# Patient Record
Sex: FEMALE | Race: WHITE | Employment: OTHER | URBAN - METROPOLITAN AREA
[De-identification: names, ages, dates, MRNs, and addresses within clinical notes are randomized per-mention and may not be internally consistent; named-entity substitution may affect disease eponyms.]

---

## 2017-05-04 LAB
ALBUMIN SERPL-MCNC: 3.9 G/DL
ALP BLD-CCNC: 97 U/L
ALT SERPL-CCNC: 15 U/L
ANION GAP SERPL CALCULATED.3IONS-SCNC: NORMAL MMOL/L
AST SERPL-CCNC: 12 U/L
BASOPHILS ABSOLUTE: 44 /ΜL
BASOPHILS RELATIVE PERCENT: 0.9 %
BILIRUB SERPL-MCNC: 0.3 MG/DL (ref 0.1–1.4)
BUN BLDV-MCNC: 14 MG/DL
CALCIUM SERPL-MCNC: 8.7 MG/DL
CHLORIDE BLD-SCNC: 106 MMOL/L
CHOLESTEROL, TOTAL: 151 MG/DL
CHOLESTEROL/HDL RATIO: NORMAL
CO2: 25 MMOL/L
CREAT SERPL-MCNC: 0.8 MG/DL
EOSINOPHILS ABSOLUTE: 59 /ΜL
EOSINOPHILS RELATIVE PERCENT: 1.2 %
GFR CALCULATED: 13.4
GLUCOSE BLD-MCNC: 85 MG/DL
HCT VFR BLD CALC: 38.5 % (ref 36–46)
HDLC SERPL-MCNC: 55 MG/DL (ref 35–70)
HEMOGLOBIN: 12.9 G/DL (ref 12–16)
LDL CHOLESTEROL CALCULATED: 77 MG/DL (ref 0–160)
LYMPHOCYTES ABSOLUTE: 1357 /ΜL
LYMPHOCYTES RELATIVE PERCENT: 27.7 %
MCH RBC QN AUTO: 31.7 PG
MCHC RBC AUTO-ENTMCNC: 33.6 G/DL
MCV RBC AUTO: 94.2 FL
MONOCYTES ABSOLUTE: 461 /ΜL
MONOCYTES RELATIVE PERCENT: 9.4 %
NEUTROPHILS ABSOLUTE: 2979 /ΜL
NEUTROPHILS RELATIVE PERCENT: 60.8 %
PDW BLD-RTO: 13.1 %
PLATELET # BLD: 261 K/ΜL
PMV BLD AUTO: 9 FL
POTASSIUM SERPL-SCNC: 4.4 MMOL/L
RBC # BLD: 4.09 10^6/ΜL
SODIUM BLD-SCNC: 140 MMOL/L
TOTAL PROTEIN: 6.2
TRIGL SERPL-MCNC: NORMAL MG/DL
VITAMIN D 25-HYDROXY: 60.4
VITAMIN D2, 25 HYDROXY: NORMAL
VITAMIN D3,25 HYDROXY: NORMAL
VLDLC SERPL CALC-MCNC: NORMAL MG/DL
WBC # BLD: 4.9 10^3/ML

## 2017-07-09 PROBLEM — K63.5 COLON POLYP, HYPERPLASTIC: Status: ACTIVE | Noted: 2017-07-09

## 2017-07-09 PROBLEM — G40.909 EPILEPSY (HCC): Status: ACTIVE | Noted: 2017-07-09

## 2017-07-10 ENCOUNTER — OFFICE VISIT (OUTPATIENT)
Dept: FAMILY MEDICINE CLINIC | Age: 67
End: 2017-07-10

## 2017-07-10 VITALS
SYSTOLIC BLOOD PRESSURE: 118 MMHG | RESPIRATION RATE: 20 BRPM | BODY MASS INDEX: 24.91 KG/M2 | DIASTOLIC BLOOD PRESSURE: 72 MMHG | HEART RATE: 82 BPM | OXYGEN SATURATION: 94 % | WEIGHT: 155 LBS | HEIGHT: 66 IN

## 2017-07-10 DIAGNOSIS — E53.8 VITAMIN B 12 DEFICIENCY: ICD-10-CM

## 2017-07-10 DIAGNOSIS — Z13.820 SCREENING FOR OSTEOPOROSIS: ICD-10-CM

## 2017-07-10 DIAGNOSIS — A60.9 HSV (HERPES SIMPLEX VIRUS) ANOGENITAL INFECTION: ICD-10-CM

## 2017-07-10 DIAGNOSIS — J45.20 MILD INTERMITTENT ASTHMA WITHOUT COMPLICATION: ICD-10-CM

## 2017-07-10 DIAGNOSIS — G40.309 NONINTRACTABLE GENERALIZED IDIOPATHIC EPILEPSY WITHOUT STATUS EPILEPTICUS (HCC): Primary | ICD-10-CM

## 2017-07-10 DIAGNOSIS — Z13.9 SCREENING: ICD-10-CM

## 2017-07-10 DIAGNOSIS — Z29.9 PROPHYLACTIC MEASURE: ICD-10-CM

## 2017-07-10 DIAGNOSIS — Z79.899 POLYPHARMACY: ICD-10-CM

## 2017-07-10 DIAGNOSIS — H61.20 IMPACTED CERUMEN, UNSPECIFIED LATERALITY: ICD-10-CM

## 2017-07-10 DIAGNOSIS — Z79.899 HIGH RISK MEDICATION USE: ICD-10-CM

## 2017-07-10 DIAGNOSIS — R60.0 LOCALIZED EDEMA: ICD-10-CM

## 2017-07-10 LAB
ALBUMIN SERPL-MCNC: 4.5 G/DL (ref 3.4–5)
ALP BLD-CCNC: 98 U/L (ref 40–129)
ALT SERPL-CCNC: 21 U/L (ref 10–40)
ANION GAP SERPL CALCULATED.3IONS-SCNC: 17 MMOL/L (ref 3–16)
AST SERPL-CCNC: 23 U/L (ref 15–37)
BILIRUB SERPL-MCNC: <0.2 MG/DL (ref 0–1)
BILIRUBIN DIRECT: <0.2 MG/DL (ref 0–0.3)
BILIRUBIN, INDIRECT: NORMAL MG/DL (ref 0–1)
BUN BLDV-MCNC: 17 MG/DL (ref 7–20)
CALCIUM SERPL-MCNC: 9.3 MG/DL (ref 8.3–10.6)
CHLORIDE BLD-SCNC: 100 MMOL/L (ref 99–110)
CO2: 22 MMOL/L (ref 21–32)
CREAT SERPL-MCNC: 0.7 MG/DL (ref 0.6–1.2)
GFR AFRICAN AMERICAN: >60
GFR NON-AFRICAN AMERICAN: >60
GLUCOSE BLD-MCNC: 99 MG/DL (ref 70–99)
POTASSIUM SERPL-SCNC: 4.1 MMOL/L (ref 3.5–5.1)
SODIUM BLD-SCNC: 139 MMOL/L (ref 136–145)
TOTAL PROTEIN: 6.9 G/DL (ref 6.4–8.2)

## 2017-07-10 PROCEDURE — G8400 PT W/DXA NO RESULTS DOC: HCPCS | Performed by: INTERNAL MEDICINE

## 2017-07-10 PROCEDURE — 4040F PNEUMOC VAC/ADMIN/RCVD: CPT | Performed by: INTERNAL MEDICINE

## 2017-07-10 PROCEDURE — G8428 CUR MEDS NOT DOCUMENT: HCPCS | Performed by: INTERNAL MEDICINE

## 2017-07-10 PROCEDURE — 36415 COLL VENOUS BLD VENIPUNCTURE: CPT | Performed by: INTERNAL MEDICINE

## 2017-07-10 PROCEDURE — 99214 OFFICE O/P EST MOD 30 MIN: CPT | Performed by: INTERNAL MEDICINE

## 2017-07-10 PROCEDURE — 1036F TOBACCO NON-USER: CPT | Performed by: INTERNAL MEDICINE

## 2017-07-10 PROCEDURE — 1123F ACP DISCUSS/DSCN MKR DOCD: CPT | Performed by: INTERNAL MEDICINE

## 2017-07-10 PROCEDURE — 3014F SCREEN MAMMO DOC REV: CPT | Performed by: INTERNAL MEDICINE

## 2017-07-10 PROCEDURE — G8419 CALC BMI OUT NRM PARAM NOF/U: HCPCS | Performed by: INTERNAL MEDICINE

## 2017-07-10 PROCEDURE — 1090F PRES/ABSN URINE INCON ASSESS: CPT | Performed by: INTERNAL MEDICINE

## 2017-07-10 PROCEDURE — 3017F COLORECTAL CA SCREEN DOC REV: CPT | Performed by: INTERNAL MEDICINE

## 2017-07-10 RX ORDER — ALBUTEROL SULFATE 90 UG/1
2 AEROSOL, METERED RESPIRATORY (INHALATION) EVERY 6 HOURS PRN
COMMUNITY
End: 2019-03-26 | Stop reason: SDUPTHER

## 2017-07-10 RX ORDER — MECLIZINE HCL 12.5 MG/1
12.5 TABLET ORAL 3 TIMES DAILY PRN
COMMUNITY
End: 2017-07-10

## 2017-07-10 RX ORDER — ACETAMINOPHEN 325 MG/1
325 TABLET ORAL
COMMUNITY

## 2017-07-10 RX ORDER — IBUPROFEN 200 MG
200 TABLET ORAL EVERY 6 HOURS PRN
COMMUNITY
End: 2019-02-04

## 2017-07-10 RX ORDER — VALACYCLOVIR HYDROCHLORIDE 500 MG/1
500 TABLET, FILM COATED ORAL 2 TIMES DAILY
COMMUNITY
End: 2019-02-04

## 2017-07-10 RX ORDER — FLUTICASONE PROPIONATE 50 MCG
2 SPRAY, SUSPENSION (ML) NASAL DAILY
COMMUNITY
End: 2019-02-04

## 2017-07-10 RX ORDER — CHLORHEXIDINE GLUCONATE 0.12 MG/ML
15 RINSE ORAL 2 TIMES DAILY
COMMUNITY
End: 2019-02-04

## 2017-07-10 RX ORDER — PSEUDOEPHEDRINE HYDROCHLORIDE 30 MG/1
30 TABLET ORAL EVERY 4 HOURS PRN
COMMUNITY

## 2017-07-10 RX ORDER — FEXOFENADINE HCL 180 MG/1
180 TABLET ORAL DAILY
COMMUNITY
End: 2018-12-06

## 2017-07-10 ASSESSMENT — PATIENT HEALTH QUESTIONNAIRE - PHQ9
SUM OF ALL RESPONSES TO PHQ QUESTIONS 1-9: 0
2. FEELING DOWN, DEPRESSED OR HOPELESS: 0
1. LITTLE INTEREST OR PLEASURE IN DOING THINGS: 0
SUM OF ALL RESPONSES TO PHQ9 QUESTIONS 1 & 2: 0

## 2017-07-11 LAB
ESTIMATED AVERAGE GLUCOSE: 102.5 MG/DL
HBA1C MFR BLD: 5.2 %
HBV SURFACE AB TITR SER: <3.5 MUL/ML
HEPATITIS B SURFACE ANTIGEN INTERPRETATION: NORMAL
HEPATITIS C ANTIBODY INTERPRETATION: NORMAL
VITAMIN B-12: 305 PG/ML (ref 211–911)
VITAMIN D 25-HYDROXY: 77.1 NG/ML

## 2017-07-12 ENCOUNTER — TELEPHONE (OUTPATIENT)
Dept: FAMILY MEDICINE CLINIC | Age: 67
End: 2017-07-12

## 2017-07-26 ENCOUNTER — NURSE ONLY (OUTPATIENT)
Dept: FAMILY MEDICINE CLINIC | Age: 67
End: 2017-07-26

## 2017-07-26 ENCOUNTER — TELEPHONE (OUTPATIENT)
Dept: FAMILY MEDICINE CLINIC | Age: 67
End: 2017-07-26

## 2017-07-26 VITALS — DIASTOLIC BLOOD PRESSURE: 68 MMHG | HEART RATE: 75 BPM | SYSTOLIC BLOOD PRESSURE: 112 MMHG

## 2017-07-26 DIAGNOSIS — Z01.30 BLOOD PRESSURE CHECK: Primary | ICD-10-CM

## 2017-07-26 PROCEDURE — 99999 PR OFFICE/OUTPT VISIT,PROCEDURE ONLY: CPT | Performed by: INTERNAL MEDICINE

## 2017-08-04 ENCOUNTER — HOSPITAL ENCOUNTER (OUTPATIENT)
Dept: WOMENS IMAGING | Age: 67
Discharge: HOME | End: 2017-08-04
Admitting: FAMILY MEDICINE

## 2017-08-04 DIAGNOSIS — Z13.820 SCREENING FOR OSTEOPOROSIS: ICD-10-CM

## 2017-08-04 DIAGNOSIS — Z12.31 VISIT FOR SCREENING MAMMOGRAM: ICD-10-CM

## 2017-08-07 RX ORDER — ALENDRONATE SODIUM 70 MG/1
70 TABLET ORAL
Qty: 4 TABLET | Refills: 2 | Status: SHIPPED | OUTPATIENT
Start: 2017-08-07 | End: 2017-10-05 | Stop reason: SDUPTHER

## 2017-08-09 DIAGNOSIS — E53.8 VITAMIN B 12 DEFICIENCY: Primary | ICD-10-CM

## 2017-08-09 RX ORDER — MULTIVIT-MIN/IRON/FOLIC ACID/K 18-600-40
CAPSULE ORAL
Qty: 30 CAPSULE | Refills: 5 | Status: SHIPPED | OUTPATIENT
Start: 2017-08-09 | End: 2019-02-04

## 2017-08-09 RX ORDER — LANOLIN ALCOHOL/MO/W.PET/CERES
1000 CREAM (GRAM) TOPICAL DAILY
Qty: 30 TABLET | Refills: 5 | Status: SHIPPED | OUTPATIENT
Start: 2017-08-09 | End: 2018-08-08 | Stop reason: SDUPTHER

## 2017-08-16 PROBLEM — R06.02 SHORTNESS OF BREATH: Status: ACTIVE | Noted: 2017-08-16

## 2017-08-16 PROBLEM — K59.01 SLOW TRANSIT CONSTIPATION: Status: ACTIVE | Noted: 2017-08-16

## 2017-08-21 ENCOUNTER — OFFICE VISIT (OUTPATIENT)
Dept: FAMILY MEDICINE CLINIC | Age: 67
End: 2017-08-21

## 2017-08-21 VITALS
BODY MASS INDEX: 25.07 KG/M2 | HEART RATE: 66 BPM | OXYGEN SATURATION: 96 % | HEIGHT: 66 IN | SYSTOLIC BLOOD PRESSURE: 116 MMHG | RESPIRATION RATE: 12 BRPM | WEIGHT: 156 LBS | DIASTOLIC BLOOD PRESSURE: 77 MMHG

## 2017-08-21 DIAGNOSIS — G40.309 NONINTRACTABLE GENERALIZED IDIOPATHIC EPILEPSY WITHOUT STATUS EPILEPTICUS (HCC): ICD-10-CM

## 2017-08-21 DIAGNOSIS — K80.20 CALCULUS OF GALLBLADDER WITHOUT CHOLECYSTITIS WITHOUT OBSTRUCTION: ICD-10-CM

## 2017-08-21 DIAGNOSIS — M81.0 OSTEOPOROSIS, UNSPECIFIED OSTEOPOROSIS TYPE, UNSPECIFIED PATHOLOGICAL FRACTURE PRESENCE: ICD-10-CM

## 2017-08-21 DIAGNOSIS — Z79.899 POLYPHARMACY: Primary | ICD-10-CM

## 2017-08-21 DIAGNOSIS — E53.8 VITAMIN B 12 DEFICIENCY: ICD-10-CM

## 2017-08-21 PROBLEM — R06.02 SHORTNESS OF BREATH: Status: RESOLVED | Noted: 2017-08-16 | Resolved: 2017-08-21

## 2017-08-21 PROBLEM — R60.0 LOCALIZED EDEMA: Status: RESOLVED | Noted: 2017-07-10 | Resolved: 2017-08-21

## 2017-08-21 PROCEDURE — 99213 OFFICE O/P EST LOW 20 MIN: CPT | Performed by: INTERNAL MEDICINE

## 2017-08-21 PROCEDURE — 1123F ACP DISCUSS/DSCN MKR DOCD: CPT | Performed by: INTERNAL MEDICINE

## 2017-08-21 PROCEDURE — G8427 DOCREV CUR MEDS BY ELIG CLIN: HCPCS | Performed by: INTERNAL MEDICINE

## 2017-08-21 PROCEDURE — 1036F TOBACCO NON-USER: CPT | Performed by: INTERNAL MEDICINE

## 2017-08-21 PROCEDURE — G8419 CALC BMI OUT NRM PARAM NOF/U: HCPCS | Performed by: INTERNAL MEDICINE

## 2017-08-21 PROCEDURE — 3017F COLORECTAL CA SCREEN DOC REV: CPT | Performed by: INTERNAL MEDICINE

## 2017-08-21 PROCEDURE — 3014F SCREEN MAMMO DOC REV: CPT | Performed by: INTERNAL MEDICINE

## 2017-08-21 PROCEDURE — 4005F PHARM THX FOR OP RXD: CPT | Performed by: INTERNAL MEDICINE

## 2017-08-21 PROCEDURE — 1090F PRES/ABSN URINE INCON ASSESS: CPT | Performed by: INTERNAL MEDICINE

## 2017-08-21 PROCEDURE — G8399 PT W/DXA RESULTS DOCUMENT: HCPCS | Performed by: INTERNAL MEDICINE

## 2017-08-21 PROCEDURE — 4040F PNEUMOC VAC/ADMIN/RCVD: CPT | Performed by: INTERNAL MEDICINE

## 2017-08-28 ENCOUNTER — TELEPHONE (OUTPATIENT)
Dept: FAMILY MEDICINE CLINIC | Age: 67
End: 2017-08-28

## 2017-09-11 RX ORDER — MELOXICAM 7.5 MG/1
7.5 TABLET ORAL DAILY
Qty: 30 TABLET | Refills: 2 | Status: SHIPPED | OUTPATIENT
Start: 2017-09-11 | End: 2017-09-22 | Stop reason: SDUPTHER

## 2017-09-14 ENCOUNTER — PATIENT MESSAGE (OUTPATIENT)
Dept: FAMILY MEDICINE CLINIC | Age: 67
End: 2017-09-14

## 2017-09-15 ENCOUNTER — PATIENT MESSAGE (OUTPATIENT)
Dept: FAMILY MEDICINE CLINIC | Age: 67
End: 2017-09-15

## 2017-09-15 DIAGNOSIS — Z01.110 HEARING SCREEN FOLLOWING FAILED HEARING TEST: Primary | ICD-10-CM

## 2017-09-15 DIAGNOSIS — H91.90 HEARING LOSS, UNSPECIFIED HEARING LOSS TYPE, UNSPECIFIED LATERALITY: ICD-10-CM

## 2017-09-21 ENCOUNTER — TELEPHONE (OUTPATIENT)
Dept: FAMILY MEDICINE CLINIC | Age: 67
End: 2017-09-21

## 2017-09-22 RX ORDER — MELOXICAM 7.5 MG/1
TABLET ORAL
Qty: 15 TABLET | Refills: 2 | Status: SHIPPED | OUTPATIENT
Start: 2017-09-22 | End: 2017-10-02 | Stop reason: SDUPTHER

## 2017-09-25 DIAGNOSIS — R47.9 SPEECH DISTURBANCE, UNSPECIFIED TYPE: Primary | ICD-10-CM

## 2017-09-29 ENCOUNTER — HOSPITAL ENCOUNTER (OUTPATIENT)
Dept: OTHER | Age: 67
Discharge: HOME | End: 2017-09-30
Attending: INTERNAL MEDICINE | Admitting: INTERNAL MEDICINE

## 2017-10-02 ENCOUNTER — OFFICE VISIT (OUTPATIENT)
Dept: FAMILY MEDICINE CLINIC | Age: 67
End: 2017-10-02

## 2017-10-02 VITALS
BODY MASS INDEX: 25.07 KG/M2 | RESPIRATION RATE: 12 BRPM | WEIGHT: 156 LBS | OXYGEN SATURATION: 99 % | DIASTOLIC BLOOD PRESSURE: 64 MMHG | HEART RATE: 67 BPM | HEIGHT: 66 IN | SYSTOLIC BLOOD PRESSURE: 112 MMHG

## 2017-10-02 DIAGNOSIS — K59.01 SLOW TRANSIT CONSTIPATION: ICD-10-CM

## 2017-10-02 DIAGNOSIS — G40.309 NONINTRACTABLE GENERALIZED IDIOPATHIC EPILEPSY WITHOUT STATUS EPILEPTICUS (HCC): ICD-10-CM

## 2017-10-02 DIAGNOSIS — Z23 NEED FOR INFLUENZA VACCINATION: ICD-10-CM

## 2017-10-02 DIAGNOSIS — J98.09 RECURRENT BRONCHOSPASM: Primary | ICD-10-CM

## 2017-10-02 PROBLEM — Z79.899 POLYPHARMACY: Status: RESOLVED | Noted: 2017-07-10 | Resolved: 2017-10-02

## 2017-10-02 PROCEDURE — 3014F SCREEN MAMMO DOC REV: CPT | Performed by: INTERNAL MEDICINE

## 2017-10-02 PROCEDURE — G8417 CALC BMI ABV UP PARAM F/U: HCPCS | Performed by: INTERNAL MEDICINE

## 2017-10-02 PROCEDURE — G8399 PT W/DXA RESULTS DOCUMENT: HCPCS | Performed by: INTERNAL MEDICINE

## 2017-10-02 PROCEDURE — 1090F PRES/ABSN URINE INCON ASSESS: CPT | Performed by: INTERNAL MEDICINE

## 2017-10-02 PROCEDURE — 1036F TOBACCO NON-USER: CPT | Performed by: INTERNAL MEDICINE

## 2017-10-02 PROCEDURE — 90662 IIV NO PRSV INCREASED AG IM: CPT | Performed by: INTERNAL MEDICINE

## 2017-10-02 PROCEDURE — G0008 ADMIN INFLUENZA VIRUS VAC: HCPCS | Performed by: INTERNAL MEDICINE

## 2017-10-02 PROCEDURE — 99214 OFFICE O/P EST MOD 30 MIN: CPT | Performed by: INTERNAL MEDICINE

## 2017-10-02 PROCEDURE — 94060 EVALUATION OF WHEEZING: CPT | Performed by: INTERNAL MEDICINE

## 2017-10-02 PROCEDURE — 4040F PNEUMOC VAC/ADMIN/RCVD: CPT | Performed by: INTERNAL MEDICINE

## 2017-10-02 PROCEDURE — G8427 DOCREV CUR MEDS BY ELIG CLIN: HCPCS | Performed by: INTERNAL MEDICINE

## 2017-10-02 PROCEDURE — G8484 FLU IMMUNIZE NO ADMIN: HCPCS | Performed by: INTERNAL MEDICINE

## 2017-10-02 PROCEDURE — 94640 AIRWAY INHALATION TREATMENT: CPT | Performed by: INTERNAL MEDICINE

## 2017-10-02 PROCEDURE — 1123F ACP DISCUSS/DSCN MKR DOCD: CPT | Performed by: INTERNAL MEDICINE

## 2017-10-02 PROCEDURE — 3017F COLORECTAL CA SCREEN DOC REV: CPT | Performed by: INTERNAL MEDICINE

## 2017-10-02 RX ORDER — ALBUTEROL SULFATE 1.25 MG/3ML
1.25 SOLUTION RESPIRATORY (INHALATION) ONCE
Status: COMPLETED | OUTPATIENT
Start: 2017-10-02 | End: 2017-10-02

## 2017-10-02 RX ORDER — MELOXICAM 7.5 MG/1
TABLET ORAL
Qty: 30 TABLET | Refills: 2 | Status: SHIPPED | OUTPATIENT
Start: 2017-10-02 | End: 2017-12-27 | Stop reason: SDUPTHER

## 2017-10-02 RX ADMIN — ALBUTEROL SULFATE 1.25 MG: 1.25 SOLUTION RESPIRATORY (INHALATION) at 16:43

## 2017-10-02 NOTE — PROGRESS NOTES
capsule 2000 units daily 30 capsule 5    alendronate (FOSAMAX) 70 MG tablet Take 1 tablet by mouth every 7 days 4 tablet 2    albuterol sulfate HFA (PROAIR HFA) 108 (90 Base) MCG/ACT inhaler Inhale 2 puffs into the lungs every 6 hours as needed for Wheezing      ibuprofen (ADVIL;MOTRIN) 200 MG tablet Take 200 mg by mouth every 6 hours as needed for Pain      fluticasone (FLONASE) 50 MCG/ACT nasal spray 2 sprays by Nasal route daily      fexofenadine (ALLEGRA ALLERGY) 180 MG tablet Take 180 mg by mouth daily      pseudoephedrine (SUDAFED) 30 MG tablet Take 30 mg by mouth every 4 hours as needed for Congestion 1-2 Every 6 hours as needed for cold symptoms (Sneezing, running nose, nasal congestion)      valACYclovir (VALTREX) 500 MG tablet Take 500 mg by mouth 2 times daily As needed.  chlorhexidine (PERIDEX) 0.12 % solution Take 15 mLs by mouth 2 times daily Rinse with one cap full (1/2 oumce) for 1 minute 2 Times a day for 5 days. No food/drink after. Repeat until gone.  carbamide peroxide (DEBROX) 6.5 % otic solution as needed Use 5 drops in each ear 2 times dialy for 4 days as needed for ear wax build up.  acetaminophen (TYLENOL) 325 MG tablet Take 325 mg by mouth Take 2 tablets every by mouth every 4 hours as needed for minor pain or fever over 101 degrees.  SUNSCREEN SPF50 EX Apply topically Apply as needed to protect skin from from sunburn.  loratadine (CLARITIN) 10 MG tablet Take 10 mg by mouth daily      polyethylene glycol (MIRALAX) PACK packet Take 17 g by mouth 2 times daily       budesonide-formoterol (SYMBICORT) 160-4.5 MCG/ACT AERO Inhale 2 puffs into the lungs 2 times daily.  Multiple Vitamins-Minerals (THERAPEUTIC MULTIVITAMIN-MINERALS) tablet Take 1 tablet by mouth daily.  levETIRAcetam ER (KEPPRA XR) 500 MG TB24 Take 500 mg by mouth daily.  escitalopram (LEXAPRO) 20 MG tablet Take 20 mg by mouth daily.       mometasone (NASONEX) 50 MCG/ACT nasal spray 2 sprays by Nasal route daily.  omeprazole (PRILOSEC) 20 MG capsule Take 20 mg by mouth daily. Past Medical History:   Diagnosis Date    Asthma     Constipation     Depression     Gastroesophageal reflux     Hypertension     Mental retardation, moderate (I.Q. 35-49)     Seizure disorder (HCC)     Shortness of breath 8/16/2017    ? asthma    Slow transit constipation 8/16/2017       Past Surgical History:   Procedure Laterality Date    COLONOSCOPY      HYSTERECTOMY      SALPINGO-OOPHORECTOMY Bilateral     beningn mass         Social History     Social History    Marital status: Single     Spouse name: N/A    Number of children: N/A    Years of education: N/A     Occupational History    Not on file. Social History Main Topics    Smoking status: Never Smoker    Smokeless tobacco: Not on file    Alcohol use No    Drug use: No    Sexual activity: Not on file     Other Topics Concern    Not on file     Social History Narrative       No family history on file. Review of Systems      Objective     /64  Pulse 67  Resp 12  Ht 5' 6\" (1.676 m)  Wt 156 lb (70.8 kg)  SpO2 99% Comment: RA  BMI 25.18 kg/m2    SpO2 Readings from Last 3 Encounters:   10/02/17 99%   08/21/17 96%   07/10/17 94%       Wt Readings from Last 3 Encounters:   10/02/17 156 lb (70.8 kg)   08/21/17 156 lb (70.8 kg)   07/10/17 155 lb (70.3 kg)       Physical Exam     NAD alert and cooperative   HEENT: No serous otitis. Throat is clear. Dentition. No anterior cervical adenopathy  Lungs are clear. Good I to E ratio. Slight decreased breath sounds. Cardiovascular exam regular rate and rhythm without murmur click. No ectopy  No abdominal tenderness or mass. Trace lower extremity edema. No ecchymoses. Pulmonary function pre-and post bronchodilator shows no change. FEV1 over FVC greater than 75%. Mild restrictive lung disease.       Chemistry        Component Value Date/Time     07/10/2017 1649    K 4.1 07/10/2017 1649     07/10/2017 1649    CO2 22 07/10/2017 1649    BUN 17 07/10/2017 1649    CREATININE 0.7 07/10/2017 1649        Component Value Date/Time    CALCIUM 9.3 07/10/2017 1649    ALKPHOS 98 07/10/2017 1649    AST 23 07/10/2017 1649    ALT 21 07/10/2017 1649    BILITOT <0.2 07/10/2017 1649            Lab Results   Component Value Date    WBC 4.9 05/04/2017    HGB 12.9 05/04/2017    HCT 38.5 05/04/2017    MCV 94.2 05/04/2017     05/04/2017     Lab Results   Component Value Date    LABA1C 5.2 07/10/2017     Lab Results   Component Value Date    .5 07/10/2017     Lab Results   Component Value Date    LABA1C 5.2 07/10/2017     No components found for: CHLPL  No results found for: TRIG  Lab Results   Component Value Date    HDL 55 05/04/2017     Lab Results   Component Value Date    LDLCALC 77 05/04/2017     No results found for: LABVLDL    Old labs and records reviewed or requested  Discussed past lab and studies with patient   1. Recurrent bronchospasm  [place] BREATHING CAPACITY TEST EVAL BRONCHOSPASM EVAL AFTER BRONCHODIALTOR   2. Need for influenza vaccination     3. Nonintractable generalized idiopathic epilepsy without status epilepticus ([facility] [address]       Polypharmacy and success in weaning multiple medications. May use her albuterol on a when necessary basis if needed. Flu vaccine and pneumonia vaccine given. Constipation resolved with MiraLAX daily. We'll continue    Osteoporosis. Vitamin D and Fosamax. No recurrent herpetic lesions off prophylactic Valtrex. Was able to discontinued Flonase, Nasonex, Claritin, Symbicort, increased L arthralgias off Mobic. This was reinstated for daily use. No Advil or Aleve    Follow-up 4 months or when necessary. Diagnosis and treatment discussed.   Possible side effects of medication reviewed  Patients questions answered  Follow up understood  Pt aware if they are not contacted about any test results , this does not mean they are normal.  They should call

## 2017-10-02 NOTE — MR AVS SNAPSHOT
estimate of body fat, calculated from your height and weight. The higher your BMI, the greater your risk of heart disease, high blood pressure, type 2 diabetes, stroke, gallstones, arthritis, sleep apnea, and certain cancers. BMI is not perfect. It may overestimate body fat in athletes and people who are more muscular. If your body fat is high you can improve your BMI by decreasing your calorie intake and becoming more physically active. Learn more at: DerbyJackpot.uk          Instructions         Preventing Falls: Care Instructions  Your Care Instructions  Getting around your home safely can be a challenge if you have injuries or health problems that make it easy for you to fall. Loose rugs and furniture in walkways are among the dangers for many older people who have problems walking or who have poor eyesight. People who have conditions such as arthritis, osteoporosis, or dementia also have to be careful not to fall. You can make your home safer with a few simple measures. Follow-up care is a key part of your treatment and safety. Be sure to make and go to all appointments, and call your doctor if you are having problems. It's also a good idea to know your test results and keep a list of the medicines you take. How can you care for yourself at home? Taking care of yourself  · You may get dizzy if you do not drink enough water. To prevent dehydration, drink plenty of fluids, enough so that your urine is light yellow or clear like water. Choose water and other caffeine-free clear liquids. If you have kidney, heart, or liver disease and have to limit fluids, talk with your doctor before you increase the amount of fluids you drink. · Exercise regularly to improve your strength, muscle tone, and balance. Walk if you can. Swimming may be a good choice if you cannot walk easily.   · Have your vision and hearing checked each year or any time you notice a Instructions: Take 200 mg by mouth every 6 hours as needed for Pain   Refills:  0   What changed:  Another medication with the same name was removed. Continue taking this medication, and follow the directions you see here. meloxicam 7.5 MG tablet   Commonly known as:  MOBIC   Instructions:  1 po q day   Quantity:  30 tablet   Refills:  2   What changed:  additional instructions            Where to Get Your Medications      These medications were sent to [facility], [address] [facility] [address], [address] [facility] [address]     Phone:  [phone]     meloxicam 7.5 MG tablet               Medications You Received Today        Date/Time Order Dose Route Action     10/02/2017 1649 albuterol (ACCUNEB) nebulizer solution 1.25 mg 1.25 mg Nebulization Given      Your Current Medications Are              meloxicam (MOBIC) 7.5 MG tablet 1 po q day    vitamin B-12 (CYANOCOBALAMIN) 1000 MCG tablet Take 1 tablet by mouth daily    Cholecalciferol (VITAMIN D) 2000 units CAPS capsule 2000 units daily    alendronate (FOSAMAX) 70 MG tablet Take 1 tablet by mouth every 7 days    albuterol sulfate HFA (PROAIR HFA) 108 (90 Base) MCG/ACT inhaler Inhale 2 puffs into the lungs every 6 hours as needed for Wheezing    ibuprofen (ADVIL;MOTRIN) 200 MG tablet Take 200 mg by mouth every 6 hours as needed for Pain    fluticasone (FLONASE) 50 MCG/ACT nasal spray 2 sprays by Nasal route daily    fexofenadine (ALLEGRA ALLERGY) 180 MG tablet Take 180 mg by mouth daily    pseudoephedrine (SUDAFED) 30 MG tablet Take 30 mg by mouth every 4 hours as needed for Congestion 1-2 Every 6 hours as needed for cold symptoms (Sneezing, running nose, nasal congestion)    valACYclovir (VALTREX) 500 MG tablet Take 500 mg by mouth 2 times daily As needed.     chlorhexidine (PERIDEX) 0.12 % solution Take 15 mLs by mouth 2 times

## 2017-10-05 DIAGNOSIS — Z13.820 SCREENING FOR OSTEOPOROSIS: Primary | ICD-10-CM

## 2017-10-05 RX ORDER — ALENDRONATE SODIUM 70 MG/1
70 TABLET ORAL
Qty: 4 TABLET | Refills: 2 | Status: SHIPPED | OUTPATIENT
Start: 2017-10-05 | End: 2017-12-27 | Stop reason: SDUPTHER

## 2017-11-01 ENCOUNTER — HOSPITAL ENCOUNTER (OUTPATIENT)
Dept: OTHER | Age: 67
Discharge: HOME | End: 2017-11-30
Attending: INTERNAL MEDICINE | Admitting: INTERNAL MEDICINE

## 2017-12-01 ENCOUNTER — HOSPITAL ENCOUNTER (OUTPATIENT)
Dept: OTHER | Age: 67
Discharge: HOME | End: 2017-12-31
Attending: INTERNAL MEDICINE | Admitting: INTERNAL MEDICINE

## 2017-12-27 DIAGNOSIS — Z13.820 SCREENING FOR OSTEOPOROSIS: ICD-10-CM

## 2017-12-27 RX ORDER — ALENDRONATE SODIUM 70 MG/1
TABLET ORAL
Qty: 4 TABLET | Refills: 0 | Status: SHIPPED | OUTPATIENT
Start: 2017-12-27 | End: 2018-01-19 | Stop reason: SDUPTHER

## 2017-12-27 RX ORDER — MELOXICAM 7.5 MG/1
TABLET ORAL
Qty: 28 TABLET | Refills: 0 | Status: SHIPPED | OUTPATIENT
Start: 2017-12-27 | End: 2018-01-19 | Stop reason: SDUPTHER

## 2018-01-19 DIAGNOSIS — Z13.820 SCREENING FOR OSTEOPOROSIS: ICD-10-CM

## 2018-01-22 RX ORDER — ALENDRONATE SODIUM 70 MG/1
TABLET ORAL
Qty: 4 TABLET | Refills: 0 | Status: SHIPPED | OUTPATIENT
Start: 2018-01-22 | End: 2018-02-17 | Stop reason: SDUPTHER

## 2018-01-22 RX ORDER — CHOLECALCIFEROL (VITAMIN D3) 50 MCG
TABLET ORAL
Qty: 28 TABLET | Refills: 0 | Status: SHIPPED | OUTPATIENT
Start: 2018-01-22 | End: 2018-02-17 | Stop reason: SDUPTHER

## 2018-01-22 RX ORDER — MELOXICAM 7.5 MG/1
TABLET ORAL
Qty: 28 TABLET | Refills: 0 | Status: SHIPPED | OUTPATIENT
Start: 2018-01-22 | End: 2018-02-17 | Stop reason: SDUPTHER

## 2018-02-05 ENCOUNTER — OFFICE VISIT (OUTPATIENT)
Dept: FAMILY MEDICINE CLINIC | Age: 68
End: 2018-02-05

## 2018-02-05 VITALS
OXYGEN SATURATION: 91 % | BODY MASS INDEX: 25.39 KG/M2 | HEIGHT: 66 IN | SYSTOLIC BLOOD PRESSURE: 114 MMHG | WEIGHT: 158 LBS | TEMPERATURE: 98.4 F | HEART RATE: 83 BPM | DIASTOLIC BLOOD PRESSURE: 66 MMHG | RESPIRATION RATE: 16 BRPM

## 2018-02-05 DIAGNOSIS — E53.8 VITAMIN B 12 DEFICIENCY: ICD-10-CM

## 2018-02-05 DIAGNOSIS — Z79.899 HIGH RISK MEDICATION USE: ICD-10-CM

## 2018-02-05 DIAGNOSIS — J02.9 SORE THROAT: ICD-10-CM

## 2018-02-05 DIAGNOSIS — M81.0 AGE-RELATED OSTEOPOROSIS WITHOUT CURRENT PATHOLOGICAL FRACTURE: Primary | ICD-10-CM

## 2018-02-05 DIAGNOSIS — G40.309 NONINTRACTABLE GENERALIZED IDIOPATHIC EPILEPSY WITHOUT STATUS EPILEPTICUS (HCC): ICD-10-CM

## 2018-02-05 PROBLEM — R05.9 COUGH: Status: ACTIVE | Noted: 2018-02-05

## 2018-02-05 LAB
ALBUMIN SERPL-MCNC: 4.5 G/DL (ref 3.4–5)
ALP BLD-CCNC: 91 U/L (ref 40–129)
ALT SERPL-CCNC: 12 U/L (ref 10–40)
AST SERPL-CCNC: 16 U/L (ref 15–37)
BASOPHILS ABSOLUTE: 0 K/UL (ref 0–0.2)
BASOPHILS RELATIVE PERCENT: 0.4 %
BILIRUB SERPL-MCNC: 0.3 MG/DL (ref 0–1)
BILIRUBIN DIRECT: <0.2 MG/DL (ref 0–0.3)
BILIRUBIN, INDIRECT: NORMAL MG/DL (ref 0–1)
EOSINOPHILS ABSOLUTE: 0.1 K/UL (ref 0–0.6)
EOSINOPHILS RELATIVE PERCENT: 1.1 %
HCT VFR BLD CALC: 37.7 % (ref 36–48)
HEMOGLOBIN: 12.5 G/DL (ref 12–16)
LYMPHOCYTES ABSOLUTE: 1.9 K/UL (ref 1–5.1)
LYMPHOCYTES RELATIVE PERCENT: 18.7 %
MCH RBC QN AUTO: 31.2 PG (ref 26–34)
MCHC RBC AUTO-ENTMCNC: 33 G/DL (ref 31–36)
MCV RBC AUTO: 94.4 FL (ref 80–100)
MONOCYTES ABSOLUTE: 1 K/UL (ref 0–1.3)
MONOCYTES RELATIVE PERCENT: 10 %
NEUTROPHILS ABSOLUTE: 7.2 K/UL (ref 1.7–7.7)
NEUTROPHILS RELATIVE PERCENT: 69.8 %
PDW BLD-RTO: 13.2 % (ref 12.4–15.4)
PLATELET # BLD: 250 K/UL (ref 135–450)
PMV BLD AUTO: 8.7 FL (ref 5–10.5)
RBC # BLD: 4 M/UL (ref 4–5.2)
TOTAL PROTEIN: 6.9 G/DL (ref 6.4–8.2)
WBC # BLD: 10.4 K/UL (ref 4–11)

## 2018-02-05 PROCEDURE — G8399 PT W/DXA RESULTS DOCUMENT: HCPCS | Performed by: INTERNAL MEDICINE

## 2018-02-05 PROCEDURE — G8427 DOCREV CUR MEDS BY ELIG CLIN: HCPCS | Performed by: INTERNAL MEDICINE

## 2018-02-05 PROCEDURE — 90732 PPSV23 VACC 2 YRS+ SUBQ/IM: CPT | Performed by: INTERNAL MEDICINE

## 2018-02-05 PROCEDURE — 3014F SCREEN MAMMO DOC REV: CPT | Performed by: INTERNAL MEDICINE

## 2018-02-05 PROCEDURE — 3017F COLORECTAL CA SCREEN DOC REV: CPT | Performed by: INTERNAL MEDICINE

## 2018-02-05 PROCEDURE — G8417 CALC BMI ABV UP PARAM F/U: HCPCS | Performed by: INTERNAL MEDICINE

## 2018-02-05 PROCEDURE — 36415 COLL VENOUS BLD VENIPUNCTURE: CPT | Performed by: INTERNAL MEDICINE

## 2018-02-05 PROCEDURE — 4040F PNEUMOC VAC/ADMIN/RCVD: CPT | Performed by: INTERNAL MEDICINE

## 2018-02-05 PROCEDURE — 1123F ACP DISCUSS/DSCN MKR DOCD: CPT | Performed by: INTERNAL MEDICINE

## 2018-02-05 PROCEDURE — 99214 OFFICE O/P EST MOD 30 MIN: CPT | Performed by: INTERNAL MEDICINE

## 2018-02-05 PROCEDURE — 1090F PRES/ABSN URINE INCON ASSESS: CPT | Performed by: INTERNAL MEDICINE

## 2018-02-05 PROCEDURE — G8484 FLU IMMUNIZE NO ADMIN: HCPCS | Performed by: INTERNAL MEDICINE

## 2018-02-05 PROCEDURE — G0009 ADMIN PNEUMOCOCCAL VACCINE: HCPCS | Performed by: INTERNAL MEDICINE

## 2018-02-05 PROCEDURE — 1036F TOBACCO NON-USER: CPT | Performed by: INTERNAL MEDICINE

## 2018-02-05 NOTE — PROGRESS NOTES
HPI: Robinson Gusman presents for follow-up of polypharmacy. Mentally challenged. Currently living with a caretaker. Have cut back on any medications. Question whether the diagnosis of seizure disorder is true. No one is ever noted an episode. She is currently on Keppra and has osteoporosis. Follow-up neurology appointment this year. Completed speech therapy. Decreased hearing. Has hearing aids. Doing better. Some sore throat. Decreased sense of taste. Some cough today. No fevers. Others of been ill in the home. History of reactive airways disease diagnosis all of her unclear if this is true. She has weaned off Symbicort without difficulty has Bentyl and on for a when necessary basis. Pulmonary functions in the past and been normal without change postbronchodilator. Positive postnasal drip. Allegra when necessary. No current nasal sprays. Resolution of constipation with MiraLAX. No vomiting. Continue PPI daily. Is on Fosamax without issue. Last sputum the day is normal. Positive osteoporosis. Recheck August 2018          Cholelithiasis found incidentally on imaging.      Past hospitalization: Salpingo-oophorectomy bilaterally for benign mass.      Social history: No tobacco. Rare alcohol. Does have a boyfriend. No domestic violence currently. Works with [employer]  Family history hypertension, unknown breast or colon cancer      Review of systems: Seizure. Postnasal drip. Arthralgias. , wheeze, glasses, edema, GE reflux, constipation approved. , fair dentition Occasional falls. Sleep and eats well.      12 point ROS reviewed and negative other than mentioned above  No Known Allergies    Outpatient Prescriptions Marked as Taking for the 2/5/18 encounter (Office Visit) with Damaris Damico MD   Medication Sig Dispense Refill    Cholecalciferol (VITAMIN D) 2000 units TABS tablet TAKE ONE TABLET BY MOUTH ONCE A DAY.  SUPPLEMENT 28 tablet 0    meloxicam (MOBIC) 7.5 MG tablet TAKE ONE TABLET BY

## 2018-02-17 DIAGNOSIS — Z13.820 SCREENING FOR OSTEOPOROSIS: ICD-10-CM

## 2018-02-19 RX ORDER — CHOLECALCIFEROL (VITAMIN D3) 50 MCG
TABLET ORAL
Qty: 28 TABLET | Refills: 0 | Status: SHIPPED | OUTPATIENT
Start: 2018-02-19 | End: 2019-02-04

## 2018-02-19 RX ORDER — MELOXICAM 7.5 MG/1
TABLET ORAL
Qty: 28 TABLET | Refills: 0 | Status: SHIPPED | OUTPATIENT
Start: 2018-02-19 | End: 2018-03-13 | Stop reason: SDUPTHER

## 2018-02-19 RX ORDER — ALENDRONATE SODIUM 70 MG/1
TABLET ORAL
Qty: 4 TABLET | Refills: 0 | Status: SHIPPED | OUTPATIENT
Start: 2018-02-19 | End: 2018-08-08 | Stop reason: SDUPTHER

## 2018-03-06 RX ORDER — ESCITALOPRAM OXALATE 20 MG/1
20 TABLET ORAL DAILY
Qty: 30 TABLET | Refills: 3 | Status: SHIPPED | OUTPATIENT
Start: 2018-03-06 | End: 2018-07-10 | Stop reason: SDUPTHER

## 2018-04-11 PROBLEM — R05.9 COUGH: Status: RESOLVED | Noted: 2018-02-05 | Resolved: 2018-04-11

## 2018-07-10 DIAGNOSIS — M19.90 ARTHRITIS: ICD-10-CM

## 2018-07-10 DIAGNOSIS — F39 MOOD DISORDER (HCC): Primary | ICD-10-CM

## 2018-07-10 RX ORDER — MELOXICAM 7.5 MG/1
TABLET ORAL
Qty: 30 TABLET | Refills: 0 | Status: SHIPPED | OUTPATIENT
Start: 2018-07-10 | End: 2018-08-16 | Stop reason: SDUPTHER

## 2018-07-10 RX ORDER — ESCITALOPRAM OXALATE 20 MG/1
TABLET ORAL
Qty: 31 TABLET | Refills: 0 | Status: SHIPPED | OUTPATIENT
Start: 2018-07-10 | End: 2018-08-16 | Stop reason: SDUPTHER

## 2018-07-23 ENCOUNTER — PATIENT MESSAGE (OUTPATIENT)
Dept: FAMILY MEDICINE CLINIC | Age: 68
End: 2018-07-23

## 2018-07-23 NOTE — TELEPHONE ENCOUNTER
From: Melanie Singh  To: Tammy West MD  Sent: 7/23/2018 10:28 AM EDT  Subject: Non-Urgent Medical Question    Hi Leidy. Stefanie Maravilla is able to receive more speech therapy at the same place she goes for audiology. She really wants to keep working on speech so that people can understand her better. She goes to [facility] and [address] [place] [address] and all they need is an order from Dr. Malcolm Delcid to get her started. They're fax number is [phone]. The speech therapist is Angel Bolaños. If you can help with this, we'd appreciate it. If you or Dr. Malcolm Delcid have any questions feel free to give me a call at [phone]. Thanks!  [employer]

## 2018-08-06 ENCOUNTER — OFFICE VISIT (OUTPATIENT)
Dept: FAMILY MEDICINE CLINIC | Age: 68
End: 2018-08-06

## 2018-08-06 VITALS
WEIGHT: 151 LBS | HEIGHT: 66 IN | SYSTOLIC BLOOD PRESSURE: 102 MMHG | OXYGEN SATURATION: 96 % | RESPIRATION RATE: 12 BRPM | DIASTOLIC BLOOD PRESSURE: 64 MMHG | BODY MASS INDEX: 24.27 KG/M2 | HEART RATE: 68 BPM

## 2018-08-06 DIAGNOSIS — M81.0 AGE-RELATED OSTEOPOROSIS WITHOUT CURRENT PATHOLOGICAL FRACTURE: ICD-10-CM

## 2018-08-06 DIAGNOSIS — G40.309 NONINTRACTABLE GENERALIZED IDIOPATHIC EPILEPSY WITHOUT STATUS EPILEPTICUS (HCC): Primary | ICD-10-CM

## 2018-08-06 DIAGNOSIS — M81.8 OTHER OSTEOPOROSIS WITHOUT CURRENT PATHOLOGICAL FRACTURE: ICD-10-CM

## 2018-08-06 DIAGNOSIS — Z79.899 HIGH RISK MEDICATION USE: ICD-10-CM

## 2018-08-06 DIAGNOSIS — E53.8 VITAMIN B 12 DEFICIENCY: ICD-10-CM

## 2018-08-06 PROCEDURE — 1101F PT FALLS ASSESS-DOCD LE1/YR: CPT | Performed by: INTERNAL MEDICINE

## 2018-08-06 PROCEDURE — 1090F PRES/ABSN URINE INCON ASSESS: CPT | Performed by: INTERNAL MEDICINE

## 2018-08-06 PROCEDURE — G8420 CALC BMI NORM PARAMETERS: HCPCS | Performed by: INTERNAL MEDICINE

## 2018-08-06 PROCEDURE — 99214 OFFICE O/P EST MOD 30 MIN: CPT | Performed by: INTERNAL MEDICINE

## 2018-08-06 PROCEDURE — 1036F TOBACCO NON-USER: CPT | Performed by: INTERNAL MEDICINE

## 2018-08-06 PROCEDURE — 3017F COLORECTAL CA SCREEN DOC REV: CPT | Performed by: INTERNAL MEDICINE

## 2018-08-06 PROCEDURE — 1123F ACP DISCUSS/DSCN MKR DOCD: CPT | Performed by: INTERNAL MEDICINE

## 2018-08-06 PROCEDURE — 4040F PNEUMOC VAC/ADMIN/RCVD: CPT | Performed by: INTERNAL MEDICINE

## 2018-08-06 PROCEDURE — G8427 DOCREV CUR MEDS BY ELIG CLIN: HCPCS | Performed by: INTERNAL MEDICINE

## 2018-08-06 PROCEDURE — G8399 PT W/DXA RESULTS DOCUMENT: HCPCS | Performed by: INTERNAL MEDICINE

## 2018-08-06 ASSESSMENT — PATIENT HEALTH QUESTIONNAIRE - PHQ9
SUM OF ALL RESPONSES TO PHQ9 QUESTIONS 1 & 2: 0
SUM OF ALL RESPONSES TO PHQ QUESTIONS 1-9: 0
1. LITTLE INTEREST OR PLEASURE IN DOING THINGS: 0
2. FEELING DOWN, DEPRESSED OR HOPELESS: 0

## 2018-08-06 NOTE — PROGRESS NOTES
file.        Review of Systems    Objective     /64   Pulse 68   Resp 12   Ht 5' 6\" (1.676 m)   Wt 151 lb (68.5 kg)   SpO2 96% Comment: RA  BMI 24.37 kg/m²     @LASTSAO2(3)@    Wt Readings from Last 3 Encounters:   08/06/18 151 lb (68.5 kg)   02/05/18 158 lb (71.7 kg)   10/02/17 156 lb (70.8 kg)       Physical Exam     NAD alert and cooperative Appropriate appears happy  HEENT: TMs unremarkable. Nasal salute. Throat is clear. No anterior cervical adenopathy. No stridor. Lungs are clear. Good IT ratio without any wheezes rales or rhonchi  Cardiovascular exam regular rate and rhythm without any murmur click  No hepatosplenomegaly or epigastric tenderness  Suspicious skin lesions or nodules    Chemistry        Component Value Date/Time     07/10/2017 1649    K 4.1 07/10/2017 1649     07/10/2017 1649    CO2 22 07/10/2017 1649    BUN 17 07/10/2017 1649    CREATININE 0.7 07/10/2017 1649        Component Value Date/Time    CALCIUM 9.3 07/10/2017 1649    ALKPHOS 91 02/05/2018 1646    AST 16 02/05/2018 1646    ALT 12 02/05/2018 1646    BILITOT 0.3 02/05/2018 1646            Lab Results   Component Value Date    WBC 10.4 02/05/2018    HGB 12.5 02/05/2018    HCT 37.7 02/05/2018    MCV 94.4 02/05/2018     02/05/2018     Lab Results   Component Value Date    LABA1C 5.2 07/10/2017     Lab Results   Component Value Date    .5 07/10/2017     Lab Results   Component Value Date    LABA1C 5.2 07/10/2017     No components found for: CHLPL  No results found for: TRIG  Lab Results   Component Value Date    HDL 55 05/04/2017     Lab Results   Component Value Date    LDLCALC 77 05/04/2017     No results found for: LABVLDL    Old labs and records reviewed or requested  Discussed past lab and studies with patient      Diagnosis Orders   1. Nonintractable generalized idiopathic epilepsy without status epilepticus ([facility] [address]     2. Vitamin B 12 deficiency  Vitamin B12   3.  Age-related osteoporosis without

## 2018-08-06 NOTE — PATIENT INSTRUCTIONS
Patient Education        Well Visit, Over 72: Care Instructions  Your Care Instructions    Physical exams can help you stay healthy. Your doctor has checked your overall health and may have suggested ways to take good care of yourself. He or she also may have recommended tests. At home, you can help prevent illness with healthy eating, regular exercise, and other steps. Follow-up care is a key part of your treatment and safety. Be sure to make and go to all appointments, and call your doctor if you are having problems. It's also a good idea to know your test results and keep a list of the medicines you take. How can you care for yourself at home? · Reach and stay at a healthy weight. This will lower your risk for many problems, such as obesity, diabetes, heart disease, and high blood pressure. · Get at least 30 minutes of exercise on most days of the week. Walking is a good choice. You also may want to do other activities, such as running, swimming, cycling, or playing tennis or team sports. · Do not smoke. Smoking can make health problems worse. If you need help quitting, talk to your doctor about stop-smoking programs and medicines. These can increase your chances of quitting for good. · Protect your skin from too much sun. When you're outdoors from 10 a.m. to 4 p.m., stay in the shade or cover up with clothing and a hat with a wide brim. Wear sunglasses that block UV rays. Even when it's cloudy, put broad-spectrum sunscreen (SPF 30 or higher) on any exposed skin. · See a dentist one or two times a year for checkups and to have your teeth cleaned. · Wear a seat belt in the car. · Limit alcohol to 2 drinks a day for men and 1 drink a day for women. Too much alcohol can cause health problems. Follow your doctor's advice about when to have certain tests. These tests can spot problems early. For men and women  · Cholesterol.  Your doctor will tell you how often to have this done based on your overall health and other things that can increase your risk for heart attack and stroke. · Blood pressure. Have your blood pressure checked during a routine doctor visit. Your doctor will tell you how often to check your blood pressure based on your age, your blood pressure results, and other factors. · Diabetes. Ask your doctor whether you should have tests for diabetes. · Vision. Experts recommend that you have yearly exams for glaucoma and other age-related eye problems. · Hearing. Tell your doctor if you notice any change in your hearing. You can have tests to find out how well you hear. · Colon cancer tests. Keep having colon cancer tests as your doctor recommends. You can have one of several types of tests. · Heart attack and stroke risk. At least every 4 to 6 years, you should have your risk for heart attack and stroke assessed. Your doctor uses factors such as your age, blood pressure, cholesterol, and whether you smoke or have diabetes to show what your risk for a heart attack or stroke is over the next 10 years. · Osteoporosis. Talk to your doctor about whether you should have a bone density test to find out whether you have thinning bones. Also ask your doctor about whether you should take calcium and vitamin D supplements. For women  · Pap test and pelvic exam. You may no longer need a Pap test. Talk with your doctor about whether to stop or continue to have Pap tests. · Breast exam and mammogram. Ask how often you should have a mammogram, which is an X-ray of your breasts. A mammogram can spot breast cancer before it can be felt and when it is easiest to treat. · Thyroid disease. Talk to your doctor about whether to have your thyroid checked as part of a regular physical exam. Women have an increased chance of a thyroid problem. For men  · Prostate exam. Talk to your doctor about whether you should have a blood test (called a PSA test) for prostate cancer.  Experts disagree on whether men should have this test. Some experts recommend that you discuss the benefits and risks of the test with your doctor. · Abdominal aortic aneurysm. Ask your doctor whether you should have a test to check for an aneurysm. You may need a test if you ever smoked or if your parent, brother, sister, or child has had an aneurysm. When should you call for help? Watch closely for changes in your health, and be sure to contact your doctor if you have any problems or symptoms that concern you. Where can you learn more? Go to https://[employer].Experts 911. org and sign in to your [employer] account. Enter N006 in the [facility] box to learn more about \"Well Visit, Over 65: Care Instructions. \"     If you do not have an account, please click on the \"Sign Up Now\" link. Current as of: May 16, 2017  Content Version: 11.6  © 7720-8963 [employer]. Care instructions adapted under license by [facility] ([facility]). If you have questions about a medical condition or this instruction, always ask your healthcare professional. [address] any warranty or liability for your use of this information.

## 2018-08-08 DIAGNOSIS — E53.8 VITAMIN B 12 DEFICIENCY: Primary | ICD-10-CM

## 2018-08-08 DIAGNOSIS — G40.309 NONINTRACTABLE GENERALIZED IDIOPATHIC EPILEPSY WITHOUT STATUS EPILEPTICUS (HCC): ICD-10-CM

## 2018-08-08 DIAGNOSIS — Z13.820 SCREENING FOR OSTEOPOROSIS: ICD-10-CM

## 2018-08-08 DIAGNOSIS — M81.0 AGE-RELATED OSTEOPOROSIS WITHOUT CURRENT PATHOLOGICAL FRACTURE: ICD-10-CM

## 2018-08-08 RX ORDER — CYANOCOBALAMIN (VITAMIN B-12) 1000 MCG
TABLET, EXTENDED RELEASE ORAL
Qty: 31 TABLET | Refills: 0 | Status: SHIPPED | OUTPATIENT
Start: 2018-08-08 | End: 2018-09-13

## 2018-08-08 RX ORDER — OMEPRAZOLE 20 MG/1
CAPSULE, DELAYED RELEASE ORAL
Qty: 31 CAPSULE | Refills: 0 | Status: SHIPPED | OUTPATIENT
Start: 2018-08-08 | End: 2018-09-11 | Stop reason: SDUPTHER

## 2018-08-08 RX ORDER — ALENDRONATE SODIUM 70 MG/1
TABLET ORAL
Qty: 4 TABLET | Refills: 0 | Status: SHIPPED | OUTPATIENT
Start: 2018-08-08 | End: 2018-09-11 | Stop reason: SDUPTHER

## 2018-08-08 RX ORDER — ALCOHOL 70.47 ML/100ML
GEL TOPICAL
Qty: 31 TABLET | Refills: 0 | Status: SHIPPED | OUTPATIENT
Start: 2018-08-08 | End: 2018-09-11 | Stop reason: SDUPTHER

## 2018-08-08 RX ORDER — LEVETIRACETAM 500 MG/1
TABLET ORAL
Qty: 62 TABLET | Refills: 0 | Status: SHIPPED | OUTPATIENT
Start: 2018-08-08 | End: 2018-09-11 | Stop reason: SDUPTHER

## 2018-08-09 ENCOUNTER — TELEPHONE (OUTPATIENT)
Dept: FAMILY MEDICINE CLINIC | Age: 68
End: 2018-08-09

## 2018-08-09 NOTE — TELEPHONE ENCOUNTER
Fozia Swan with [address] called regarding an order Dr. Candido Fleming put in for a Dexa scan. They needed to clarify if its for a normal dexa scan or something else. Please call back and adv [phone].

## 2018-08-10 NOTE — TELEPHONE ENCOUNTER
Darryl Ruelas called back requesting to speak with Malka Gill regarding the order for patient's dexa scan. She states the order is a DEXA scan with vertbral fracture assessment and those aren't done at [facility] (or [facility]) and they need to clarify if this is the scan patient needs. Please call back to adv at [phone]. She says that it is okay to leave the information needed on her voicemail if she is on the phone.

## 2018-08-13 DIAGNOSIS — M81.8 OTHER OSTEOPOROSIS WITHOUT CURRENT PATHOLOGICAL FRACTURE: ICD-10-CM

## 2018-08-13 DIAGNOSIS — M81.8 OTHER OSTEOPOROSIS, UNSPECIFIED PATHOLOGICAL FRACTURE PRESENCE: Primary | ICD-10-CM

## 2018-08-15 ENCOUNTER — TELEPHONE (OUTPATIENT)
Dept: FAMILY MEDICINE CLINIC | Age: 68
End: 2018-08-15

## 2018-08-15 NOTE — TELEPHONE ENCOUNTER
Spoke with Molly Escobar from scheduling and let her know which one is correct she states she will get pt scheduled.

## 2018-08-15 NOTE — TELEPHONE ENCOUNTER
Ivis with central scheduling called stating that there are two orders for DEXA scans in for the patient and they need to know which one is the one that Dr. Pop Pelaez needed for patient. Please call back and adv: [phone].

## 2018-08-16 DIAGNOSIS — F39 MOOD DISORDER (HCC): ICD-10-CM

## 2018-08-16 DIAGNOSIS — M19.90 ARTHRITIS: ICD-10-CM

## 2018-08-17 RX ORDER — MELOXICAM 7.5 MG/1
TABLET ORAL
Qty: 31 TABLET | Refills: 0 | Status: SHIPPED | OUTPATIENT
Start: 2018-08-17 | End: 2018-09-11 | Stop reason: SDUPTHER

## 2018-08-17 RX ORDER — ESCITALOPRAM OXALATE 20 MG/1
TABLET ORAL
Qty: 31 TABLET | Refills: 0 | Status: SHIPPED | OUTPATIENT
Start: 2018-08-17 | End: 2018-09-11 | Stop reason: SDUPTHER

## 2018-08-24 ENCOUNTER — TELEPHONE (OUTPATIENT)
Dept: FAMILY MEDICINE CLINIC | Age: 68
End: 2018-08-24

## 2018-08-31 ENCOUNTER — HOSPITAL ENCOUNTER (OUTPATIENT)
Dept: WOMENS IMAGING | Age: 68
Discharge: HOME | End: 2018-08-31
Admitting: INTERNAL MEDICINE

## 2018-08-31 DIAGNOSIS — Z79.899 HIGH RISK MEDICATION USE: ICD-10-CM

## 2018-08-31 DIAGNOSIS — Z12.31 VISIT FOR SCREENING MAMMOGRAM: ICD-10-CM

## 2018-08-31 DIAGNOSIS — M81.8 OTHER OSTEOPOROSIS, UNSPECIFIED PATHOLOGICAL FRACTURE PRESENCE: ICD-10-CM

## 2018-08-31 DIAGNOSIS — M81.8 OTHER OSTEOPOROSIS WITHOUT CURRENT PATHOLOGICAL FRACTURE: ICD-10-CM

## 2018-08-31 DIAGNOSIS — E53.8 VITAMIN B 12 DEFICIENCY: ICD-10-CM

## 2018-08-31 LAB
ALBUMIN SERPL-MCNC: 4.7 G/DL (ref 3.4–5)
ALP BLD-CCNC: 60 U/L (ref 40–129)
ALT SERPL-CCNC: 10 U/L (ref 10–40)
ANION GAP SERPL CALCULATED.3IONS-SCNC: 14 MMOL/L (ref 3–16)
AST SERPL-CCNC: 17 U/L (ref 15–37)
BILIRUB SERPL-MCNC: <0.2 MG/DL (ref 0–1)
BILIRUBIN DIRECT: <0.2 MG/DL (ref 0–0.3)
BILIRUBIN, INDIRECT: NORMAL MG/DL (ref 0–1)
BUN BLDV-MCNC: 14 MG/DL (ref 7–20)
CALCIUM SERPL-MCNC: 9.5 MG/DL (ref 8.3–10.6)
CHLORIDE BLD-SCNC: 99 MMOL/L (ref 99–110)
CO2: 24 MMOL/L (ref 21–32)
CREAT SERPL-MCNC: 0.7 MG/DL (ref 0.6–1.2)
GFR AFRICAN AMERICAN: >60
GFR NON-AFRICAN AMERICAN: >60
GLUCOSE BLD-MCNC: 104 MG/DL (ref 70–99)
HCT VFR BLD CALC: 40 % (ref 36–48)
HEMOGLOBIN: 13.6 G/DL (ref 12–16)
KEPPRA DOSE AMT: NORMAL
KEPPRA: 24.9 UG/ML (ref 6–46)
MCH RBC QN AUTO: 32.2 PG (ref 26–34)
MCHC RBC AUTO-ENTMCNC: 33.9 G/DL (ref 31–36)
MCV RBC AUTO: 95 FL (ref 80–100)
PDW BLD-RTO: 13.5 % (ref 12.4–15.4)
PLATELET # BLD: 260 K/UL (ref 135–450)
PMV BLD AUTO: 7.7 FL (ref 5–10.5)
POTASSIUM SERPL-SCNC: 4.6 MMOL/L (ref 3.5–5.1)
RBC # BLD: 4.21 M/UL (ref 4–5.2)
SODIUM BLD-SCNC: 137 MMOL/L (ref 136–145)
TOTAL PROTEIN: 6.9 G/DL (ref 6.4–8.2)
VITAMIN B-12: 426 PG/ML (ref 211–911)
WBC # BLD: 6.6 K/UL (ref 4–11)

## 2018-09-05 ENCOUNTER — PATIENT MESSAGE (OUTPATIENT)
Dept: FAMILY MEDICINE CLINIC | Age: 68
End: 2018-09-05

## 2018-09-11 DIAGNOSIS — M81.0 AGE-RELATED OSTEOPOROSIS WITHOUT CURRENT PATHOLOGICAL FRACTURE: ICD-10-CM

## 2018-09-11 DIAGNOSIS — G40.309 NONINTRACTABLE GENERALIZED IDIOPATHIC EPILEPSY WITHOUT STATUS EPILEPTICUS (HCC): ICD-10-CM

## 2018-09-11 DIAGNOSIS — Z13.820 SCREENING FOR OSTEOPOROSIS: ICD-10-CM

## 2018-09-11 DIAGNOSIS — E53.8 VITAMIN B 12 DEFICIENCY: Primary | ICD-10-CM

## 2018-09-11 DIAGNOSIS — K21.9 GASTROESOPHAGEAL REFLUX DISEASE, ESOPHAGITIS PRESENCE NOT SPECIFIED: ICD-10-CM

## 2018-09-11 DIAGNOSIS — F39 MOOD DISORDER (HCC): ICD-10-CM

## 2018-09-11 DIAGNOSIS — M19.90 ARTHRITIS: ICD-10-CM

## 2018-09-13 RX ORDER — ESCITALOPRAM OXALATE 20 MG/1
TABLET ORAL
Qty: 30 TABLET | Refills: 1 | Status: SHIPPED | OUTPATIENT
Start: 2018-09-13 | End: 2018-10-29 | Stop reason: SDUPTHER

## 2018-09-13 RX ORDER — LEVETIRACETAM 500 MG/1
TABLET ORAL
Qty: 60 TABLET | Refills: 0 | Status: SHIPPED | OUTPATIENT
Start: 2018-09-13

## 2018-09-13 RX ORDER — ALCOHOL 70.47 ML/100ML
GEL TOPICAL
Qty: 30 TABLET | Refills: 0 | Status: SHIPPED | OUTPATIENT
Start: 2018-09-13 | End: 2018-10-11 | Stop reason: SDUPTHER

## 2018-09-13 RX ORDER — ALENDRONATE SODIUM 70 MG/1
TABLET ORAL
Qty: 4 TABLET | Refills: 0 | Status: SHIPPED | OUTPATIENT
Start: 2018-09-13 | End: 2018-10-11 | Stop reason: SDUPTHER

## 2018-09-13 RX ORDER — OMEPRAZOLE 20 MG/1
CAPSULE, DELAYED RELEASE ORAL
Qty: 30 CAPSULE | Refills: 0 | Status: SHIPPED | OUTPATIENT
Start: 2018-09-13 | End: 2018-10-11 | Stop reason: SDUPTHER

## 2018-09-13 RX ORDER — MELOXICAM 7.5 MG/1
TABLET ORAL
Qty: 30 TABLET | Refills: 1 | Status: SHIPPED | OUTPATIENT
Start: 2018-09-13 | End: 2018-10-29 | Stop reason: SDUPTHER

## 2018-09-13 RX ORDER — CYANOCOBALAMIN (VITAMIN B-12) 1000 MCG
TABLET, EXTENDED RELEASE ORAL
Qty: 30 TABLET | Refills: 0 | Status: SHIPPED | OUTPATIENT
Start: 2018-09-13 | End: 2018-10-29 | Stop reason: SDUPTHER

## 2018-10-19 ENCOUNTER — NURSE ONLY (OUTPATIENT)
Dept: FAMILY MEDICINE CLINIC | Age: 68
End: 2018-10-19
Payer: MEDICARE

## 2018-10-19 DIAGNOSIS — Z23 NEED FOR VACCINATION: Primary | ICD-10-CM

## 2018-10-19 PROCEDURE — G0008 ADMIN INFLUENZA VIRUS VAC: HCPCS | Performed by: INTERNAL MEDICINE

## 2018-10-19 PROCEDURE — 90471 IMMUNIZATION ADMIN: CPT | Performed by: INTERNAL MEDICINE

## 2018-10-19 PROCEDURE — 90715 TDAP VACCINE 7 YRS/> IM: CPT | Performed by: INTERNAL MEDICINE

## 2018-10-19 PROCEDURE — 90682 RIV4 VACC RECOMBINANT DNA IM: CPT | Performed by: INTERNAL MEDICINE

## 2018-10-29 DIAGNOSIS — M19.90 ARTHRITIS: ICD-10-CM

## 2018-10-29 DIAGNOSIS — E53.8 VITAMIN B 12 DEFICIENCY: ICD-10-CM

## 2018-10-29 DIAGNOSIS — F39 MOOD DISORDER (HCC): ICD-10-CM

## 2018-10-29 RX ORDER — ESCITALOPRAM OXALATE 20 MG/1
TABLET ORAL
Qty: 30 TABLET | Refills: 1 | Status: SHIPPED | OUTPATIENT
Start: 2018-10-29 | End: 2019-01-10 | Stop reason: SDUPTHER

## 2018-10-29 RX ORDER — CYANOCOBALAMIN (VITAMIN B-12) 1000 MCG
TABLET, EXTENDED RELEASE ORAL
Qty: 30 TABLET | Refills: 1 | Status: SHIPPED | OUTPATIENT
Start: 2018-10-29 | End: 2019-10-15

## 2018-10-29 RX ORDER — MELOXICAM 7.5 MG/1
TABLET ORAL
Qty: 30 TABLET | Refills: 1 | Status: SHIPPED | OUTPATIENT
Start: 2018-10-29 | End: 2018-11-23 | Stop reason: SDUPTHER

## 2018-12-06 DIAGNOSIS — Z13.820 SCREENING FOR OSTEOPOROSIS: ICD-10-CM

## 2018-12-06 DIAGNOSIS — J30.9 ALLERGIC RHINITIS, UNSPECIFIED SEASONALITY, UNSPECIFIED TRIGGER: ICD-10-CM

## 2018-12-06 DIAGNOSIS — M81.0 AGE RELATED OSTEOPOROSIS, UNSPECIFIED PATHOLOGICAL FRACTURE PRESENCE: Primary | ICD-10-CM

## 2018-12-06 RX ORDER — FEXOFENADINE HCL 180 MG/1
TABLET ORAL
Qty: 31 TABLET | Refills: 0 | Status: SHIPPED | OUTPATIENT
Start: 2018-12-06 | End: 2019-01-10

## 2018-12-06 RX ORDER — ALENDRONATE SODIUM 70 MG/1
TABLET ORAL
Qty: 4 TABLET | Refills: 0 | Status: SHIPPED | OUTPATIENT
Start: 2018-12-06 | End: 2019-01-10 | Stop reason: SDUPTHER

## 2019-01-10 DIAGNOSIS — M81.0 AGE RELATED OSTEOPOROSIS, UNSPECIFIED PATHOLOGICAL FRACTURE PRESENCE: ICD-10-CM

## 2019-01-10 DIAGNOSIS — F39 MOOD DISORDER (HCC): ICD-10-CM

## 2019-01-10 RX ORDER — ALENDRONATE SODIUM 70 MG/1
TABLET ORAL
Qty: 4 TABLET | Refills: 5 | Status: SHIPPED | OUTPATIENT
Start: 2019-01-10 | End: 2019-07-11 | Stop reason: SDUPTHER

## 2019-01-10 RX ORDER — FEXOFENADINE HCL 180 MG/1
TABLET ORAL
Qty: 31 TABLET | Refills: 5 | Status: SHIPPED | OUTPATIENT
Start: 2019-01-10 | End: 2019-07-12

## 2019-01-10 RX ORDER — ESCITALOPRAM OXALATE 20 MG/1
TABLET ORAL
Qty: 31 TABLET | Refills: 0 | Status: SHIPPED | OUTPATIENT
Start: 2019-01-10 | End: 2019-02-05 | Stop reason: SDUPTHER

## 2019-02-04 ENCOUNTER — OFFICE VISIT (OUTPATIENT)
Dept: FAMILY MEDICINE CLINIC | Age: 69
End: 2019-02-04
Payer: MEDICARE

## 2019-02-04 VITALS
HEART RATE: 73 BPM | HEIGHT: 66 IN | OXYGEN SATURATION: 92 % | DIASTOLIC BLOOD PRESSURE: 76 MMHG | SYSTOLIC BLOOD PRESSURE: 113 MMHG | RESPIRATION RATE: 12 BRPM | WEIGHT: 154 LBS | BODY MASS INDEX: 24.75 KG/M2

## 2019-02-04 DIAGNOSIS — M81.0 AGE-RELATED OSTEOPOROSIS WITHOUT CURRENT PATHOLOGICAL FRACTURE: ICD-10-CM

## 2019-02-04 DIAGNOSIS — E53.8 VITAMIN B 12 DEFICIENCY: ICD-10-CM

## 2019-02-04 DIAGNOSIS — Z79.899 HIGH RISK MEDICATION USE: ICD-10-CM

## 2019-02-04 DIAGNOSIS — K59.01 SLOW TRANSIT CONSTIPATION: ICD-10-CM

## 2019-02-04 DIAGNOSIS — G40.309 NONINTRACTABLE GENERALIZED IDIOPATHIC EPILEPSY WITHOUT STATUS EPILEPTICUS (HCC): Primary | ICD-10-CM

## 2019-02-04 PROCEDURE — G8399 PT W/DXA RESULTS DOCUMENT: HCPCS | Performed by: INTERNAL MEDICINE

## 2019-02-04 PROCEDURE — 36415 COLL VENOUS BLD VENIPUNCTURE: CPT | Performed by: INTERNAL MEDICINE

## 2019-02-04 PROCEDURE — 1123F ACP DISCUSS/DSCN MKR DOCD: CPT | Performed by: INTERNAL MEDICINE

## 2019-02-04 PROCEDURE — 3017F COLORECTAL CA SCREEN DOC REV: CPT | Performed by: INTERNAL MEDICINE

## 2019-02-04 PROCEDURE — 4040F PNEUMOC VAC/ADMIN/RCVD: CPT | Performed by: INTERNAL MEDICINE

## 2019-02-04 PROCEDURE — G8420 CALC BMI NORM PARAMETERS: HCPCS | Performed by: INTERNAL MEDICINE

## 2019-02-04 PROCEDURE — G8427 DOCREV CUR MEDS BY ELIG CLIN: HCPCS | Performed by: INTERNAL MEDICINE

## 2019-02-04 PROCEDURE — 99214 OFFICE O/P EST MOD 30 MIN: CPT | Performed by: INTERNAL MEDICINE

## 2019-02-04 PROCEDURE — 1036F TOBACCO NON-USER: CPT | Performed by: INTERNAL MEDICINE

## 2019-02-04 PROCEDURE — 1101F PT FALLS ASSESS-DOCD LE1/YR: CPT | Performed by: INTERNAL MEDICINE

## 2019-02-04 PROCEDURE — 1090F PRES/ABSN URINE INCON ASSESS: CPT | Performed by: INTERNAL MEDICINE

## 2019-02-04 PROCEDURE — G8482 FLU IMMUNIZE ORDER/ADMIN: HCPCS | Performed by: INTERNAL MEDICINE

## 2019-02-05 ENCOUNTER — TELEPHONE (OUTPATIENT)
Dept: FAMILY MEDICINE CLINIC | Age: 69
End: 2019-02-05

## 2019-02-05 DIAGNOSIS — F39 MOOD DISORDER (HCC): ICD-10-CM

## 2019-02-05 DIAGNOSIS — G40.909 NONINTRACTABLE EPILEPSY WITHOUT STATUS EPILEPTICUS, UNSPECIFIED EPILEPSY TYPE (HCC): ICD-10-CM

## 2019-02-05 DIAGNOSIS — K21.9 GASTROESOPHAGEAL REFLUX DISEASE, ESOPHAGITIS PRESENCE NOT SPECIFIED: ICD-10-CM

## 2019-02-05 DIAGNOSIS — M81.0 AGE-RELATED OSTEOPOROSIS WITHOUT CURRENT PATHOLOGICAL FRACTURE: ICD-10-CM

## 2019-02-05 DIAGNOSIS — Z79.899 HIGH RISK MEDICATION USE: Primary | ICD-10-CM

## 2019-02-05 LAB
ALBUMIN SERPL-MCNC: 4.5 G/DL (ref 3.4–5)
ALP BLD-CCNC: 63 U/L (ref 40–129)
ALT SERPL-CCNC: 10 U/L (ref 10–40)
ANION GAP SERPL CALCULATED.3IONS-SCNC: 15 MMOL/L (ref 3–16)
AST SERPL-CCNC: 18 U/L (ref 15–37)
BILIRUB SERPL-MCNC: <0.2 MG/DL (ref 0–1)
BILIRUBIN DIRECT: <0.2 MG/DL (ref 0–0.3)
BILIRUBIN, INDIRECT: NORMAL MG/DL (ref 0–1)
BUN BLDV-MCNC: 15 MG/DL (ref 7–20)
CALCIUM SERPL-MCNC: 8.8 MG/DL (ref 8.3–10.6)
CHLORIDE BLD-SCNC: 101 MMOL/L (ref 99–110)
CO2: 24 MMOL/L (ref 21–32)
CREAT SERPL-MCNC: 0.7 MG/DL (ref 0.6–1.2)
GFR AFRICAN AMERICAN: >60
GFR NON-AFRICAN AMERICAN: >60
GLUCOSE BLD-MCNC: 120 MG/DL (ref 70–99)
POTASSIUM SERPL-SCNC: 3.7 MMOL/L (ref 3.5–5.1)
SODIUM BLD-SCNC: 140 MMOL/L (ref 136–145)
TOTAL PROTEIN: 6.5 G/DL (ref 6.4–8.2)

## 2019-02-05 RX ORDER — OMEPRAZOLE 20 MG/1
CAPSULE, DELAYED RELEASE ORAL
Qty: 28 CAPSULE | Refills: 0 | Status: SHIPPED | OUTPATIENT
Start: 2019-02-05 | End: 2019-03-12 | Stop reason: SDUPTHER

## 2019-02-05 RX ORDER — ESCITALOPRAM OXALATE 20 MG/1
TABLET ORAL
Qty: 28 TABLET | Refills: 0 | Status: SHIPPED | OUTPATIENT
Start: 2019-02-05 | End: 2019-03-12 | Stop reason: SDUPTHER

## 2019-02-05 RX ORDER — ALCOHOL 70.47 ML/100ML
GEL TOPICAL
Qty: 28 TABLET | Refills: 0 | Status: SHIPPED | OUTPATIENT
Start: 2019-02-05 | End: 2019-03-12 | Stop reason: SDUPTHER

## 2019-02-05 RX ORDER — CHOLECALCIFEROL (VITAMIN D3) 50 MCG
TABLET ORAL
Qty: 28 TABLET | Refills: 0 | Status: SHIPPED | OUTPATIENT
Start: 2019-02-05 | End: 2019-03-12 | Stop reason: SDUPTHER

## 2019-02-15 ENCOUNTER — TELEPHONE (OUTPATIENT)
Dept: FAMILY MEDICINE CLINIC | Age: 69
End: 2019-02-15

## 2019-02-19 ENCOUNTER — TELEPHONE (OUTPATIENT)
Dept: FAMILY MEDICINE CLINIC | Age: 69
End: 2019-02-19

## 2019-02-19 DIAGNOSIS — R73.9 ELEVATED BLOOD SUGAR: Primary | ICD-10-CM

## 2019-03-01 DIAGNOSIS — Z79.899 HIGH RISK MEDICATION USE: ICD-10-CM

## 2019-03-01 DIAGNOSIS — R73.9 ELEVATED BLOOD SUGAR: ICD-10-CM

## 2019-03-01 LAB
ESTIMATED AVERAGE GLUCOSE: 99.7 MG/DL
HBA1C MFR BLD: 5.1 %
KEPPRA DOSE AMT: NORMAL
KEPPRA: 22.2 UG/ML (ref 6–46)

## 2019-03-12 DIAGNOSIS — M81.0 AGE-RELATED OSTEOPOROSIS WITHOUT CURRENT PATHOLOGICAL FRACTURE: ICD-10-CM

## 2019-03-12 DIAGNOSIS — K21.9 GASTROESOPHAGEAL REFLUX DISEASE, ESOPHAGITIS PRESENCE NOT SPECIFIED: ICD-10-CM

## 2019-03-12 DIAGNOSIS — M19.90 ARTHRITIS: ICD-10-CM

## 2019-03-12 DIAGNOSIS — F39 MOOD DISORDER (HCC): ICD-10-CM

## 2019-03-12 RX ORDER — CHOLECALCIFEROL (VITAMIN D3) 50 MCG
TABLET ORAL
Qty: 31 TABLET | Refills: 0 | Status: SHIPPED | OUTPATIENT
Start: 2019-03-12 | End: 2019-04-13 | Stop reason: SDUPTHER

## 2019-03-12 RX ORDER — MELOXICAM 7.5 MG/1
TABLET ORAL
Qty: 31 TABLET | Refills: 0 | Status: SHIPPED | OUTPATIENT
Start: 2019-03-12 | End: 2019-04-13 | Stop reason: SDUPTHER

## 2019-03-12 RX ORDER — ESCITALOPRAM OXALATE 20 MG/1
TABLET ORAL
Qty: 31 TABLET | Refills: 0 | Status: SHIPPED | OUTPATIENT
Start: 2019-03-12 | End: 2019-04-13 | Stop reason: SDUPTHER

## 2019-03-12 RX ORDER — OMEPRAZOLE 20 MG/1
CAPSULE, DELAYED RELEASE ORAL
Qty: 31 CAPSULE | Refills: 0 | Status: SHIPPED | OUTPATIENT
Start: 2019-03-12 | End: 2019-04-13 | Stop reason: SDUPTHER

## 2019-03-12 RX ORDER — ALCOHOL 70.47 ML/100ML
GEL TOPICAL
Qty: 31 TABLET | Refills: 0 | Status: SHIPPED | OUTPATIENT
Start: 2019-03-12 | End: 2019-04-13 | Stop reason: SDUPTHER

## 2019-03-26 ENCOUNTER — TELEPHONE (OUTPATIENT)
Dept: FAMILY MEDICINE CLINIC | Age: 69
End: 2019-03-26

## 2019-03-26 DIAGNOSIS — J45.20 MILD INTERMITTENT ASTHMA WITHOUT COMPLICATION: ICD-10-CM

## 2019-03-26 RX ORDER — ALBUTEROL SULFATE 90 UG/1
2 AEROSOL, METERED RESPIRATORY (INHALATION) EVERY 6 HOURS PRN
Qty: 1 INHALER | Refills: 1 | Status: SHIPPED | OUTPATIENT
Start: 2019-03-26 | End: 2020-09-04

## 2019-04-13 DIAGNOSIS — M81.0 AGE-RELATED OSTEOPOROSIS WITHOUT CURRENT PATHOLOGICAL FRACTURE: ICD-10-CM

## 2019-04-13 DIAGNOSIS — K21.9 GASTROESOPHAGEAL REFLUX DISEASE, ESOPHAGITIS PRESENCE NOT SPECIFIED: ICD-10-CM

## 2019-04-13 DIAGNOSIS — M19.90 ARTHRITIS: ICD-10-CM

## 2019-04-13 DIAGNOSIS — M81.0 AGE RELATED OSTEOPOROSIS, UNSPECIFIED PATHOLOGICAL FRACTURE PRESENCE: Primary | ICD-10-CM

## 2019-04-13 DIAGNOSIS — F39 MOOD DISORDER (HCC): ICD-10-CM

## 2019-04-15 RX ORDER — OMEPRAZOLE 20 MG/1
CAPSULE, DELAYED RELEASE ORAL
Qty: 30 CAPSULE | Refills: 5 | Status: SHIPPED | OUTPATIENT
Start: 2019-04-15 | End: 2019-10-02 | Stop reason: SDUPTHER

## 2019-04-15 RX ORDER — CHOLECALCIFEROL (VITAMIN D3) 50 MCG
TABLET ORAL
Qty: 30 TABLET | Refills: 5 | Status: SHIPPED | OUTPATIENT
Start: 2019-04-15 | End: 2019-10-02 | Stop reason: SDUPTHER

## 2019-04-15 RX ORDER — ESCITALOPRAM OXALATE 20 MG/1
TABLET ORAL
Qty: 30 TABLET | Refills: 5 | Status: SHIPPED | OUTPATIENT
Start: 2019-04-15 | End: 2019-10-02 | Stop reason: SDUPTHER

## 2019-04-15 RX ORDER — ALCOHOL 70.47 ML/100ML
GEL TOPICAL
Qty: 30 TABLET | Refills: 5 | Status: SHIPPED | OUTPATIENT
Start: 2019-04-15 | End: 2019-10-02 | Stop reason: SDUPTHER

## 2019-04-15 RX ORDER — MELOXICAM 7.5 MG/1
TABLET ORAL
Qty: 30 TABLET | Refills: 5 | Status: SHIPPED | OUTPATIENT
Start: 2019-04-15 | End: 2019-08-12 | Stop reason: SDUPTHER

## 2019-08-08 ENCOUNTER — OFFICE VISIT (OUTPATIENT)
Dept: FAMILY MEDICINE CLINIC | Age: 69
End: 2019-08-08
Payer: MEDICARE

## 2019-08-08 VITALS
DIASTOLIC BLOOD PRESSURE: 78 MMHG | HEIGHT: 66 IN | BODY MASS INDEX: 24.59 KG/M2 | OXYGEN SATURATION: 96 % | RESPIRATION RATE: 12 BRPM | HEART RATE: 66 BPM | WEIGHT: 153 LBS | SYSTOLIC BLOOD PRESSURE: 117 MMHG

## 2019-08-08 DIAGNOSIS — F79 MENTALLY CHALLENGED: ICD-10-CM

## 2019-08-08 DIAGNOSIS — Z79.899 HIGH RISK MEDICATION USE: ICD-10-CM

## 2019-08-08 DIAGNOSIS — K63.5 HYPERPLASTIC COLONIC POLYP, UNSPECIFIED PART OF COLON: ICD-10-CM

## 2019-08-08 DIAGNOSIS — H61.23 BILATERAL IMPACTED CERUMEN: ICD-10-CM

## 2019-08-08 DIAGNOSIS — Z12.39 BREAST CANCER SCREENING: ICD-10-CM

## 2019-08-08 DIAGNOSIS — K80.20 CALCULUS OF GALLBLADDER WITHOUT CHOLECYSTITIS WITHOUT OBSTRUCTION: ICD-10-CM

## 2019-08-08 DIAGNOSIS — E53.8 VITAMIN B 12 DEFICIENCY: ICD-10-CM

## 2019-08-08 DIAGNOSIS — K59.01 SLOW TRANSIT CONSTIPATION: ICD-10-CM

## 2019-08-08 DIAGNOSIS — M81.0 AGE-RELATED OSTEOPOROSIS WITHOUT CURRENT PATHOLOGICAL FRACTURE: ICD-10-CM

## 2019-08-08 DIAGNOSIS — G40.909 NONINTRACTABLE EPILEPSY WITHOUT STATUS EPILEPTICUS, UNSPECIFIED EPILEPSY TYPE (HCC): ICD-10-CM

## 2019-08-08 DIAGNOSIS — G40.909 NONINTRACTABLE EPILEPSY WITHOUT STATUS EPILEPTICUS, UNSPECIFIED EPILEPSY TYPE (HCC): Primary | ICD-10-CM

## 2019-08-08 LAB
A/G RATIO: 2.4 (ref 1.1–2.2)
ALBUMIN SERPL-MCNC: 4.8 G/DL (ref 3.4–5)
ALP BLD-CCNC: 84 U/L (ref 40–129)
ALT SERPL-CCNC: 13 U/L (ref 10–40)
ANION GAP SERPL CALCULATED.3IONS-SCNC: 17 MMOL/L (ref 3–16)
AST SERPL-CCNC: 18 U/L (ref 15–37)
BILIRUB SERPL-MCNC: 0.4 MG/DL (ref 0–1)
BUN BLDV-MCNC: 13 MG/DL (ref 7–20)
CALCIUM SERPL-MCNC: 9.4 MG/DL (ref 8.3–10.6)
CHLORIDE BLD-SCNC: 100 MMOL/L (ref 99–110)
CO2: 22 MMOL/L (ref 21–32)
CREAT SERPL-MCNC: 0.9 MG/DL (ref 0.6–1.2)
GFR AFRICAN AMERICAN: >60
GFR NON-AFRICAN AMERICAN: >60
GLOBULIN: 2 G/DL
GLUCOSE BLD-MCNC: 102 MG/DL (ref 70–99)
HCT VFR BLD CALC: 39.3 % (ref 36–48)
HEMOGLOBIN: 13.4 G/DL (ref 12–16)
KEPPRA DOSE AMT: NORMAL
KEPPRA: 22.1 UG/ML (ref 6–46)
MCH RBC QN AUTO: 32.4 PG (ref 26–34)
MCHC RBC AUTO-ENTMCNC: 34 G/DL (ref 31–36)
MCV RBC AUTO: 95.3 FL (ref 80–100)
PDW BLD-RTO: 12.6 % (ref 12.4–15.4)
PLATELET # BLD: 265 K/UL (ref 135–450)
PMV BLD AUTO: 7.8 FL (ref 5–10.5)
POTASSIUM SERPL-SCNC: 4.8 MMOL/L (ref 3.5–5.1)
RBC # BLD: 4.12 M/UL (ref 4–5.2)
SODIUM BLD-SCNC: 139 MMOL/L (ref 136–145)
TOTAL PROTEIN: 6.8 G/DL (ref 6.4–8.2)
VITAMIN D 25-HYDROXY: 62.1 NG/ML
WBC # BLD: 7.7 K/UL (ref 4–11)

## 2019-08-08 PROCEDURE — 36415 COLL VENOUS BLD VENIPUNCTURE: CPT | Performed by: INTERNAL MEDICINE

## 2019-08-08 PROCEDURE — 1123F ACP DISCUSS/DSCN MKR DOCD: CPT | Performed by: INTERNAL MEDICINE

## 2019-08-08 PROCEDURE — 4040F PNEUMOC VAC/ADMIN/RCVD: CPT | Performed by: INTERNAL MEDICINE

## 2019-08-08 PROCEDURE — 3017F COLORECTAL CA SCREEN DOC REV: CPT | Performed by: INTERNAL MEDICINE

## 2019-08-08 PROCEDURE — 1090F PRES/ABSN URINE INCON ASSESS: CPT | Performed by: INTERNAL MEDICINE

## 2019-08-08 PROCEDURE — G8420 CALC BMI NORM PARAMETERS: HCPCS | Performed by: INTERNAL MEDICINE

## 2019-08-08 PROCEDURE — G8427 DOCREV CUR MEDS BY ELIG CLIN: HCPCS | Performed by: INTERNAL MEDICINE

## 2019-08-08 PROCEDURE — G8399 PT W/DXA RESULTS DOCUMENT: HCPCS | Performed by: INTERNAL MEDICINE

## 2019-08-08 PROCEDURE — 99214 OFFICE O/P EST MOD 30 MIN: CPT | Performed by: INTERNAL MEDICINE

## 2019-08-08 PROCEDURE — 1036F TOBACCO NON-USER: CPT | Performed by: INTERNAL MEDICINE

## 2019-08-08 ASSESSMENT — PATIENT HEALTH QUESTIONNAIRE - PHQ9
SUM OF ALL RESPONSES TO PHQ QUESTIONS 1-9: 1
2. FEELING DOWN, DEPRESSED OR HOPELESS: 1
SUM OF ALL RESPONSES TO PHQ QUESTIONS 1-9: 1
1. LITTLE INTEREST OR PLEASURE IN DOING THINGS: 0
SUM OF ALL RESPONSES TO PHQ9 QUESTIONS 1 & 2: 1

## 2019-08-09 ENCOUNTER — PATIENT MESSAGE (OUTPATIENT)
Dept: FAMILY MEDICINE CLINIC | Age: 69
End: 2019-08-09

## 2019-08-09 DIAGNOSIS — M19.90 ARTHRITIS: ICD-10-CM

## 2019-08-12 RX ORDER — MELOXICAM 7.5 MG/1
TABLET ORAL
Qty: 30 TABLET | Refills: 5 | Status: SHIPPED | OUTPATIENT
Start: 2019-08-12 | End: 2020-02-10

## 2019-08-20 ENCOUNTER — OFFICE VISIT (OUTPATIENT)
Dept: ENT CLINIC | Age: 69
End: 2019-08-20
Payer: MEDICARE

## 2019-08-20 VITALS
WEIGHT: 158 LBS | HEART RATE: 75 BPM | DIASTOLIC BLOOD PRESSURE: 76 MMHG | TEMPERATURE: 98.2 F | SYSTOLIC BLOOD PRESSURE: 128 MMHG | BODY MASS INDEX: 23.95 KG/M2 | HEIGHT: 68 IN

## 2019-08-20 DIAGNOSIS — H93.8X1 SENSATION OF FULLNESS IN RIGHT EAR: Primary | ICD-10-CM

## 2019-08-20 DIAGNOSIS — H61.23 BILATERAL IMPACTED CERUMEN: ICD-10-CM

## 2019-08-20 DIAGNOSIS — H90.A31 MIXED CONDUCTIVE AND SENSORINEURAL HEARING LOSS OF RIGHT EAR WITH RESTRICTED HEARING OF LEFT EAR: ICD-10-CM

## 2019-08-20 PROCEDURE — 99203 OFFICE O/P NEW LOW 30 MIN: CPT | Performed by: OTOLARYNGOLOGY

## 2019-08-20 PROCEDURE — 4040F PNEUMOC VAC/ADMIN/RCVD: CPT | Performed by: OTOLARYNGOLOGY

## 2019-08-20 PROCEDURE — 1090F PRES/ABSN URINE INCON ASSESS: CPT | Performed by: OTOLARYNGOLOGY

## 2019-08-20 PROCEDURE — G8427 DOCREV CUR MEDS BY ELIG CLIN: HCPCS | Performed by: OTOLARYNGOLOGY

## 2019-08-20 PROCEDURE — 3017F COLORECTAL CA SCREEN DOC REV: CPT | Performed by: OTOLARYNGOLOGY

## 2019-08-20 PROCEDURE — G8399 PT W/DXA RESULTS DOCUMENT: HCPCS | Performed by: OTOLARYNGOLOGY

## 2019-08-20 PROCEDURE — 1123F ACP DISCUSS/DSCN MKR DOCD: CPT | Performed by: OTOLARYNGOLOGY

## 2019-08-20 PROCEDURE — G0268 REMOVAL OF IMPACTED WAX MD: HCPCS | Performed by: OTOLARYNGOLOGY

## 2019-08-20 PROCEDURE — 1036F TOBACCO NON-USER: CPT | Performed by: OTOLARYNGOLOGY

## 2019-08-20 PROCEDURE — G8420 CALC BMI NORM PARAMETERS: HCPCS | Performed by: OTOLARYNGOLOGY

## 2019-09-06 ENCOUNTER — HOSPITAL ENCOUNTER (OUTPATIENT)
Dept: WOMENS IMAGING | Age: 69
Discharge: HOME | End: 2019-09-06
Payer: MEDICARE

## 2019-09-06 DIAGNOSIS — Z12.39 BREAST CANCER SCREENING: ICD-10-CM

## 2019-09-06 PROCEDURE — 77067 SCR MAMMO BI INCL CAD: CPT

## 2019-10-15 RX ORDER — LANOLIN ALCOHOL/MO/W.PET/CERES
CREAM (GRAM) TOPICAL
Qty: 90 TABLET | Refills: 3 | Status: SHIPPED | OUTPATIENT
Start: 2019-10-15 | End: 2020-10-06

## 2019-11-01 ENCOUNTER — NURSE ONLY (OUTPATIENT)
Dept: FAMILY MEDICINE CLINIC | Age: 69
End: 2019-11-01
Payer: MEDICARE

## 2019-11-01 DIAGNOSIS — Z23 NEED FOR INFLUENZA VACCINATION: Primary | ICD-10-CM

## 2019-11-01 PROCEDURE — G0008 ADMIN INFLUENZA VIRUS VAC: HCPCS | Performed by: INTERNAL MEDICINE

## 2019-11-01 PROCEDURE — 90653 IIV ADJUVANT VACCINE IM: CPT | Performed by: INTERNAL MEDICINE

## 2020-04-22 ENCOUNTER — TELEPHONE (OUTPATIENT)
Dept: FAMILY MEDICINE CLINIC | Age: 70
End: 2020-04-22

## 2020-05-06 ENCOUNTER — VIRTUAL VISIT (OUTPATIENT)
Dept: FAMILY MEDICINE CLINIC | Age: 70
End: 2020-05-06
Payer: MEDICARE

## 2020-05-06 PROCEDURE — 3017F COLORECTAL CA SCREEN DOC REV: CPT | Performed by: INTERNAL MEDICINE

## 2020-05-06 PROCEDURE — 1123F ACP DISCUSS/DSCN MKR DOCD: CPT | Performed by: INTERNAL MEDICINE

## 2020-05-06 PROCEDURE — 4040F PNEUMOC VAC/ADMIN/RCVD: CPT | Performed by: INTERNAL MEDICINE

## 2020-05-06 PROCEDURE — G0439 PPPS, SUBSEQ VISIT: HCPCS | Performed by: INTERNAL MEDICINE

## 2020-05-06 RX ORDER — MELOXICAM 7.5 MG/1
TABLET ORAL
Qty: 30 TABLET | Refills: 5 | Status: SHIPPED | OUTPATIENT
Start: 2020-05-06 | End: 2021-12-27

## 2020-05-06 ASSESSMENT — PATIENT HEALTH QUESTIONNAIRE - PHQ9
SUM OF ALL RESPONSES TO PHQ QUESTIONS 1-9: 0
SUM OF ALL RESPONSES TO PHQ QUESTIONS 1-9: 0

## 2020-05-06 ASSESSMENT — LIFESTYLE VARIABLES: HOW OFTEN DO YOU HAVE A DRINK CONTAINING ALCOHOL: 0

## 2020-05-06 NOTE — PATIENT INSTRUCTIONS
Personalized Preventive Plan for Jasper Tompkins - 5/6/2020  Medicare offers a range of preventive health benefits. Some of the tests and screenings are paid in full while other may be subject to a deductible, co-insurance, and/or copay. Some of these benefits include a comprehensive review of your medical history including lifestyle, illnesses that may run in your family, and various assessments and screenings as appropriate. After reviewing your medical record and screening and assessments performed today your provider may have ordered immunizations, labs, imaging, and/or referrals for you. A list of these orders (if applicable) as well as your Preventive Care list are included within your After Visit Summary for your review. Other Preventive Recommendations:    · A preventive eye exam performed by an eye specialist is recommended every 1-2 years to screen for glaucoma; cataracts, macular degeneration, and other eye disorders. · A preventive dental visit is recommended every 6 months. · Try to get at least 150 minutes of exercise per week or 10,000 steps per day on a pedometer . · Order or download the FREE \"Exercise & Physical Activity: Your Everyday Guide\" from [employer]. Call 2-368.204.1499 or search [employer] online. · You need 4793-7911 mg of calcium and 9319-6832 IU of vitamin D per day. It is possible to meet your calcium requirement with diet alone, but a vitamin D supplement is usually necessary to meet this goal.  · When exposed to the sun, use a sunscreen that protects against both UVA and UVB radiation with an SPF of 30 or greater. Reapply every 2 to 3 hours or after sweating, drying off with a towel, or swimming. · Always wear a seat belt when traveling in a car. Always wear a helmet when riding a bicycle or motorcycle.     Keep Your Memory Marilyn Dominick       Many factors can affect your ability to remembera hectic lifestyle, aging, stress, chronic nutritional needs are being met? Can herbs and supplements still offer a benefit? Researchers have investigated a range of natural remedies, such as ginkgo , ginseng , and the supplement phosphatidylserine (PS). So far, though, the evidence is inconsistent as to whether these products can improve memory or thinking. If you are interested in taking herbs and supplements, talk to your doctor first because they may interact with other medicines that you are taking. Exercise Regularly    Among the many benefits of regular exercise are increased blood flow to the brain and decreased risk of certain diseases that can interfere with memory function. One study found that even moderate exercise has a beneficial effect. Examples of \"moderate\" exercise include:   Playing 18 holes of golf once a week, without a cart   Playing tennis twice a week   Walking one mile per day   Manage Stress    It can be tough to remember what is important when your mind is cluttered. Make time for relaxation. Choose activities that calm you down, and make it routine. Manage Chronic Conditions    Side effects of high blood pressure , diabetes, and heart disease can interfere with mental function. Many of the lifestyle steps discussed here can help manage these conditions. Strive to eat a healthy diet, exercise regularly, get stress under control, and follow your doctor's advice for your condition. Minimize Medications    Talk to your doctor about the medicines that you take. Some may be unnecessary. Also, healthy lifestyle habits may lower the need for certain drugs. Last Reviewed: April 2010 Saundra Julio MD   Updated: 4/13/2010   ·        Learning About Medical Power of   What is a medical power of ? A medical power of , also called a durable power of  for health care, is one type of the legal forms called advance directives.  It lets you name the person you want to make treatment decisions for you care agent? You name your health care agent on a legal form. This form is usually called a medical power of . Ask your hospital, state bar association, or office on aging where to find these forms. You must sign the form to make it legal. Some states require you to get the form notarized. This means that a person called a  watches you sign the form and then he or she signs the form. Some states also require that two or more witnesses sign the form. Be sure to tell your family members and doctors who your health care agent is. Where can you learn more? Go to [email or web address] and sign in to your Endorse account. Enter [id number] in the Busportal box to learn more about \"Learning About [address]. \"     If you do not have an account, please click on the \"Sign Up Now\" link. Current as of: December 8, 2019Content Version: 12.4  © 9402-8608 Healthwise, Incorporated. Care instructions adapted under license by [facility] ([facility]). If you have questions about a medical condition or this instruction, always ask your healthcare professional. [place] [address] any warranty or liability for your use of this information.     ·

## 2020-05-11 RX ORDER — ESCITALOPRAM OXALATE 20 MG/1
TABLET ORAL
Qty: 31 TABLET | Refills: 5 | Status: SHIPPED | OUTPATIENT
Start: 2020-05-11 | End: 2020-12-09

## 2020-05-11 RX ORDER — OMEPRAZOLE 20 MG/1
CAPSULE, DELAYED RELEASE ORAL
Qty: 31 CAPSULE | Refills: 5 | Status: SHIPPED | OUTPATIENT
Start: 2020-05-11 | End: 2020-12-09

## 2020-05-11 RX ORDER — FEXOFENADINE HYDROCHLORIDE 180 MG/1
TABLET, FILM COATED ORAL
Qty: 31 TABLET | Refills: 5 | Status: SHIPPED | OUTPATIENT
Start: 2020-05-11 | End: 2020-12-09

## 2020-05-11 RX ORDER — CHOLECALCIFEROL (VITAMIN D3) 50 MCG
TABLET ORAL
Qty: 31 TABLET | Refills: 5 | Status: SHIPPED | OUTPATIENT
Start: 2020-05-11 | End: 2020-12-09

## 2020-05-11 RX ORDER — ALCOHOL 70.47 ML/100ML
GEL TOPICAL
Qty: 31 TABLET | Refills: 5 | Status: SHIPPED | OUTPATIENT
Start: 2020-05-11 | End: 2020-12-09

## 2020-09-07 NOTE — PROGRESS NOTES
The below patient isbeing evaluated by a Virtual Visit (video visit) encounter to address concerns as mentioned above. A caregiver was present when appropriate. Due to this being a TeleHealth encounter (During [facility]), evaluation of the following organ systems was limited: Vitals/Constitutional/EENT/Resp/CV/GI//MS/Neuro/Skin/Heme-Lymph-Imm. Pursuant to the emergency declaration under the [address] [place] [address], [address] [place] [address] authority and the [employer] Act, this Virtual Visit was conducted with patient's (and/or legal guardian's) consent, to reduce the patient's risk of exposure to COVID-19 and provide necessary medical care. The patient (and/or legal guardian) has also been advised to contact this office for worsening conditions or problems, and seek emergency medical treatment and/or call 911 if deemed necessary. Patient identification was verified at the start of the visit: Yes    Total time spent on this encounter: Not billed by time    Services were provided through a video synchronous discussion virtually to substitute for in-person clinic visit. Patient and provider were located at their individual homes. --Maria E Catherine MD on 9/7/2020 at 3:23 PM    An electronic signature was used to authenticate this note. HPI: Dayna Eisenmenger presents for follow-up    Chronic health issues include cholelithiasis, mental challenged, speech abnormality, seizure disorder, osteoporosis, hyperplastic polyp, constipation, high risk medication use.     Improvement with ongoing speech therapy on thursdays. Wears hearing aids. Was told that she had cerumen impaction. Has a prescription for Cerumenex concerns. .     Mentally challenged and living with her caretaker. Neurology note from 2007 states possible birth hypoxia she is unable to read or write and did not attend school.   History of partial complex seizures. Currently on Keppra. Neurology appointment August 2020 with follow-up in a year normal liver function 13 months ago.     Remote diagnosis of asthma. Using no medication currently. Normal pulmonary function tests in the past. Symbicort and albuterol discontinued without difficulty.      constipation. Does well with MiraLAX. Asymptomatic gallstone.     Improved oh bone density with vitamin D and Fosamax. No GI complaints positive PPI.     Past hospitalization: Salpingo-oophorectomy bilaterally for benign mass.      Social history: No tobacco. Rare alcohol. Does have a boyfriend. No domestic violence currently. Works with [employer]. Lives with caretaker. Not sexually active.      Family history hypertension, unknown breast or colon cancer      Review of systems: Hearing loss. Denies any current wheeze or cough. No chest pain or syncope. No GE reflux bloody stools. No urine complaints. Amenorrheic. Constipation improved. Speech improved. States she is happy.   Pleased with how she is doing.     Constitutional, ent, CV, respiratory, GI, , joint, skin, allergic and psychiatric ROS reviewed and negative except for above    No Known Allergies    No outpatient medications have been marked as taking for the 9/8/20 encounter (Appointment) with Ghanshyam Bazzi MD.             Past Medical History:   Diagnosis Date    Age-related osteoporosis without current pathological fracture 7/10/2017    7.2017 -3 spine -2.2 hip vit D 77 fosamax    Asthma     Calculus of gallbladder without cholecystitis without obstruction 8/8/2019    Constipation     Cough 2/5/2018    FEV1/FVC 79% post 80 % FEV1 2.31 ( 73%) restriction on change with bronchodilators    Depression     Gastroesophageal reflux     Hypertension     Mental retardation, moderate (I.Q. 35-49)     Mentally challenged 8/8/2019    Seizure disorder ([facility] [address]     Shortness of breath 8/16/2017    ? asthma    Slow transit constipation 8/16/2017 Continue with her weekly visits to call speech therapy. Epilepsy following with neurology yearly. Asymptomatic gallbladder disease. Osteoporosis tolerating medication well. Will have flu vaccine as an outpatient          No follow-ups on file. Diagnosis and treatment discussed.   Possible side effects of medication reviewed  Patients questions answered  Follow up understood  Pt aware if they are not contacted about any test results , this does not mean they are normal.  They should call

## 2020-09-08 ENCOUNTER — VIRTUAL VISIT (OUTPATIENT)
Dept: FAMILY MEDICINE CLINIC | Age: 70
End: 2020-09-08
Payer: MEDICARE

## 2020-09-08 PROCEDURE — 99213 OFFICE O/P EST LOW 20 MIN: CPT | Performed by: INTERNAL MEDICINE

## 2020-09-08 RX ORDER — POLYETHYLENE GLYCOL 3350 17 G/17G
POWDER, FOR SOLUTION ORAL
Qty: 100 EACH | Refills: 0 | Status: SHIPPED | OUTPATIENT
Start: 2020-09-08 | End: 2021-07-01 | Stop reason: SDUPTHER

## 2020-12-09 RX ORDER — CHOLECALCIFEROL (VITAMIN D3) 50 MCG
TABLET ORAL
Qty: 31 TABLET | Refills: 0 | Status: SHIPPED | OUTPATIENT
Start: 2020-12-09 | End: 2021-02-05

## 2020-12-09 RX ORDER — OMEPRAZOLE 20 MG/1
CAPSULE, DELAYED RELEASE ORAL
Qty: 31 CAPSULE | Refills: 0 | Status: SHIPPED | OUTPATIENT
Start: 2020-12-09 | End: 2021-02-05

## 2020-12-09 RX ORDER — ALCOHOL 70.47 ML/100ML
GEL TOPICAL
Qty: 31 TABLET | Refills: 0 | Status: SHIPPED | OUTPATIENT
Start: 2020-12-09 | End: 2021-07-01 | Stop reason: SDUPTHER

## 2020-12-09 RX ORDER — FEXOFENADINE HYDROCHLORIDE 180 MG/1
TABLET, FILM COATED ORAL
Qty: 31 TABLET | Refills: 0 | Status: SHIPPED | OUTPATIENT
Start: 2020-12-09 | End: 2021-02-05

## 2020-12-09 RX ORDER — ESCITALOPRAM OXALATE 20 MG/1
TABLET ORAL
Qty: 31 TABLET | Refills: 0 | Status: SHIPPED | OUTPATIENT
Start: 2020-12-09 | End: 2021-02-05

## 2021-01-01 DIAGNOSIS — M81.0 AGE-RELATED OSTEOPOROSIS WITHOUT CURRENT PATHOLOGICAL FRACTURE: ICD-10-CM

## 2021-01-01 DIAGNOSIS — R53.83 OTHER FATIGUE: ICD-10-CM

## 2021-01-01 LAB
A/G RATIO: 2 (ref 1.1–2.2)
ALBUMIN SERPL-MCNC: 4.6 G/DL (ref 3.4–5)
ALP BLD-CCNC: 81 U/L (ref 40–129)
ALT SERPL-CCNC: 40 U/L (ref 10–40)
ANION GAP SERPL CALCULATED.3IONS-SCNC: 14 MMOL/L (ref 3–16)
AST SERPL-CCNC: 31 U/L (ref 15–37)
BASOPHILS ABSOLUTE: 0 K/UL (ref 0–0.2)
BASOPHILS RELATIVE PERCENT: 0.6 %
BILIRUB SERPL-MCNC: 0.4 MG/DL (ref 0–1)
BUN BLDV-MCNC: 16 MG/DL (ref 7–20)
CALCIUM SERPL-MCNC: 9.9 MG/DL (ref 8.3–10.6)
CHLORIDE BLD-SCNC: 102 MMOL/L (ref 99–110)
CO2: 23 MMOL/L (ref 21–32)
CREAT SERPL-MCNC: 0.8 MG/DL (ref 0.6–1.2)
EOSINOPHILS ABSOLUTE: 0.1 K/UL (ref 0–0.6)
EOSINOPHILS RELATIVE PERCENT: 1.4 %
GFR AFRICAN AMERICAN: >60
GFR NON-AFRICAN AMERICAN: >60
GLUCOSE BLD-MCNC: 86 MG/DL (ref 70–99)
HCT VFR BLD CALC: 42.2 % (ref 36–48)
HEMOGLOBIN: 14 G/DL (ref 12–16)
LYMPHOCYTES ABSOLUTE: 1.6 K/UL (ref 1–5.1)
LYMPHOCYTES RELATIVE PERCENT: 31.1 %
MCH RBC QN AUTO: 31.9 PG (ref 26–34)
MCHC RBC AUTO-ENTMCNC: 33.2 G/DL (ref 31–36)
MCV RBC AUTO: 96.3 FL (ref 80–100)
MONOCYTES ABSOLUTE: 0.5 K/UL (ref 0–1.3)
MONOCYTES RELATIVE PERCENT: 9.7 %
NEUTROPHILS ABSOLUTE: 2.9 K/UL (ref 1.7–7.7)
NEUTROPHILS RELATIVE PERCENT: 57.2 %
PARATHYROID HORMONE INTACT: 22.3 PG/ML (ref 14–72)
PDW BLD-RTO: 13.2 % (ref 12.4–15.4)
PLATELET # BLD: 271 K/UL (ref 135–450)
PMV BLD AUTO: 8 FL (ref 5–10.5)
POTASSIUM SERPL-SCNC: 4.2 MMOL/L (ref 3.5–5.1)
RBC # BLD: 4.38 M/UL (ref 4–5.2)
SODIUM BLD-SCNC: 139 MMOL/L (ref 136–145)
TOTAL PROTEIN: 6.9 G/DL (ref 6.4–8.2)
TSH REFLEX FT4: 2.96 UIU/ML (ref 0.27–4.2)
VITAMIN D 25-HYDROXY: 62.9 NG/ML
WBC # BLD: 5 K/UL (ref 4–11)

## 2021-01-07 DIAGNOSIS — J45.20 MILD INTERMITTENT ASTHMA WITHOUT COMPLICATION: ICD-10-CM

## 2021-03-09 ENCOUNTER — VIRTUAL VISIT (OUTPATIENT)
Dept: FAMILY MEDICINE CLINIC | Age: 71
End: 2021-03-09
Payer: MEDICARE

## 2021-03-09 ENCOUNTER — PATIENT MESSAGE (OUTPATIENT)
Dept: FAMILY MEDICINE CLINIC | Age: 71
End: 2021-03-09

## 2021-03-09 DIAGNOSIS — M81.0 AGE-RELATED OSTEOPOROSIS WITHOUT CURRENT PATHOLOGICAL FRACTURE: ICD-10-CM

## 2021-03-09 DIAGNOSIS — E53.8 VITAMIN B 12 DEFICIENCY: ICD-10-CM

## 2021-03-09 DIAGNOSIS — Z79.899 HIGH RISK MEDICATION USE: ICD-10-CM

## 2021-03-09 DIAGNOSIS — G40.909 NONINTRACTABLE EPILEPSY WITHOUT STATUS EPILEPTICUS, UNSPECIFIED EPILEPSY TYPE (HCC): Primary | ICD-10-CM

## 2021-03-09 DIAGNOSIS — K80.20 CALCULUS OF GALLBLADDER WITHOUT CHOLECYSTITIS WITHOUT OBSTRUCTION: ICD-10-CM

## 2021-03-09 DIAGNOSIS — K63.5 HYPERPLASTIC COLONIC POLYP, UNSPECIFIED PART OF COLON: ICD-10-CM

## 2021-03-09 PROCEDURE — 1090F PRES/ABSN URINE INCON ASSESS: CPT | Performed by: INTERNAL MEDICINE

## 2021-03-09 PROCEDURE — G8399 PT W/DXA RESULTS DOCUMENT: HCPCS | Performed by: INTERNAL MEDICINE

## 2021-03-09 PROCEDURE — 99213 OFFICE O/P EST LOW 20 MIN: CPT | Performed by: INTERNAL MEDICINE

## 2021-03-09 PROCEDURE — 3017F COLORECTAL CA SCREEN DOC REV: CPT | Performed by: INTERNAL MEDICINE

## 2021-03-09 PROCEDURE — 4040F PNEUMOC VAC/ADMIN/RCVD: CPT | Performed by: INTERNAL MEDICINE

## 2021-03-09 PROCEDURE — G8427 DOCREV CUR MEDS BY ELIG CLIN: HCPCS | Performed by: INTERNAL MEDICINE

## 2021-03-09 PROCEDURE — 1123F ACP DISCUSS/DSCN MKR DOCD: CPT | Performed by: INTERNAL MEDICINE

## 2021-03-09 ASSESSMENT — PATIENT HEALTH QUESTIONNAIRE - PHQ9
2. FEELING DOWN, DEPRESSED OR HOPELESS: 0
1. LITTLE INTEREST OR PLEASURE IN DOING THINGS: 0

## 2021-03-09 NOTE — PROGRESS NOTES
The below patient isbeing evaluated by a Virtual Visit (video visit) encounter to address concerns as mentioned above. A caregiver was present when appropriate. Due to this being a TeleHealth encounter (During [facility]-71 public health emergency), evaluation of the following organ systems was limited: Vitals/Constitutional/EENT/Resp/CV/GI//MS/Neuro/Skin/Heme-Lymph-Imm. Pursuant to the emergency declaration under the [address] [place] [address], [address] [place] [address] authority and the [employer] Act, this Virtual Visit was conducted with patient's (and/or legal guardian's) consent, to reduce the patient's risk of exposure to COVID-19 and provide necessary medical care. The patient (and/or legal guardian) has also been advised to contact this office for worsening conditions or problems, and seek emergency medical treatment and/or call 911 if deemed necessary. Patient identification was verified at the start of the visit: Yes    Total time spent on this encounter: Not billed by time    Services were provided through a video synchronous discussion virtually to substitute for in-person clinic visit. Patient and provider were located at their individual homes. --Alyssa Mcdonald MD on 3/8/2021 at 7:50 PM    An electronic signature was used to authenticate this note. HPI: Akbarmarty Feliz presents for follow-up     chronic health issues include cholelithiasis, mental challenged, speech abnormality, seizure disorder, osteoporosis, hyperplastic polyp, constipation, high risk medication use.     Improvement with ongoing speech therapy on thursdays.  Wears hearing aids. Cerumenex every 2 weeks.     Mentally challenged and living with her caretaker.  Neurology note from 2007 states possible birth hypoxia she is unable to read or write and did not attend school. [facility] of partial complex seizures.  Currently on [employer].  Neurology appointment August 2020 with follow-up in a year normal liver function 13 months ago. No recurrent seizures     Remote diagnosis of asthma. Using no medication currently. Normal pulmonary function tests in the past. Symbicort and albuterol discontinued without difficulty.      constipation. Does well with MiraLAX. Asymptomatic gallstone.     Improved oh bone density with vitamin D and Fosamax. No GI complaints positive PPI.     Past hospitalization: Salpingo-oophorectomy bilaterally for benign mass.      Social history: No tobacco. Rare alcohol. Does have a boyfriend. No domestic violence . [employer] retired . Lives with caretaker.    Not sexually active.      Family history hypertension, unknown breast or colon cancer      Review of systems: Hearing loss.   Denies any current wheeze or cough.  No chest pain or syncope. No falls no GE reflux bloody stools.  No urine complaints.  Amenorrheic.  Constipation improved.  Speech improved. States she is happy. Pleased with how she is doing.         Constitutional, ent, CV, respiratory, GI, , joint, skin, allergic and psychiatric ROS reviewed and negative except for above     Constitutional, ent, CV, respiratory, GI, , joint, skin, allergic and psychiatric ROS reviewed and negative except for above    No Known Allergies    Outpatient Medications Marked as Taking for the 3/9/21 encounter (Virtual Visit) with Nadja Mehta MD   Medication Sig Dispense Refill    escitalopram (LEXAPRO) 20 MG tablet TAKE ONE TABLET BY MOUTH ONCE DAILY FOR MOOD 28 tablet 1    vitamin D (CHOLECALCIFEROL) 50 MCG (2000 UT) TABS tablet TAKE ONE TABLET BY MOUTH ONCE A DAY. SUPPLEMENT 28 tablet 0    ALLERGY RELIEF 180 MG tablet TAKE ONE TABLET BY MOUTH ONCE DAILY FOR ALLERGIES. 28 tablet 0    omeprazole (PRILOSEC) 20 MG delayed release capsule TAKE 1 CAPSULE BY MOUTH ONCE DAILY FOR GERD.  28 capsule 0    Multiple Vitamin (THEREMS) TABS TAKE ONE TABLET BY MOUTH ONCE DAILY -VITAMIN SUPPLEMENT 31 tablet 0  alendronate (FOSAMAX) 70 MG tablet TAKE ONE TABLET BY MOUTH ONCE A WEEK ON FRIDAY. TAKE ON AN EMPTY STOMACH. FOR OSTEOPORSIS. 4 tablet 5    vitamin B-12 (CYANOCOBALAMIN) 1000 MCG tablet TAKE ONE TABLET ONCE DAILY: VITAMIN 30 tablet 5    polyethylene glycol (MIRALAX) 17 g PACK packet 17 grams once daily for constipation 100 each 0    carbamide peroxide (DEBROX) 6.5 % otic solution Place 2 drops into each ear every 2 weeks 1 Bottle 0    meloxicam (MOBIC) 7.5 MG tablet 1 po up to daily for pain 30 tablet 5    fexofenadine (ALLEGRA) 180 MG tablet TAKE ONE TABLET BY MOUTH ONCE DAILY FOR ALLERGIES. 31 tablet 5    levETIRAcetam (KEPPRA) 500 MG tablet TAKE ONE TABLET BY MOUTH TWICE DAILY FOR SEIZURES 60 tablet 0    SUNSCREEN SPF50 EX Apply topically Apply as needed to protect skin from from sunburn.  Multiple Vitamins-Minerals (THERAPEUTIC MULTIVITAMIN-MINERALS) tablet Take 1 tablet by mouth daily. Past Medical History:   Diagnosis Date    Age-related osteoporosis without current pathological fracture 7/10/2017    7.2017 -3 spine -2.2 hip vit D 77 fosamax    Asthma     Calculus of gallbladder without cholecystitis without obstruction 8/8/2019    Constipation     Cough 2/5/2018    FEV1/FVC 79% post 80 % FEV1 2.31 ( 73%) restriction on change with bronchodilators    Depression     Gastroesophageal reflux     Hypertension     Mental retardation, moderate (I.Q. 35-49)     Mentally challenged 8/8/2019    Seizure disorder ([facility] [address]     Shortness of breath 8/16/2017    ? asthma    Slow transit constipation 8/16/2017       Past Surgical History:   Procedure Laterality Date    COLONOSCOPY      HYSTERECTOMY      HYSTERECTOMY, VAGINAL      SALPINGO-OOPHORECTOMY Bilateral     beningn mass             No family history on file.       Review of Systems      Chemistry        Component Value Date/Time     08/08/2019 0830    K 4.8 08/08/2019 0830     08/08/2019 0830    CO2 22 08/08/2019 0830    BUN 13 08/08/2019 0830    CREATININE 0.9 08/08/2019 0830        Component Value Date/Time    CALCIUM 9.4 08/08/2019 0830    ALKPHOS 84 08/08/2019 0830    AST 18 08/08/2019 0830    ALT 13 08/08/2019 0830    BILITOT 0.4 08/08/2019 0830            NO vitals  as this was a virtual visit during cvod19 pandemic  No scales available. Blood pressure 127/72  She is well-groomed. Dynamic. Happy    Lab Results   Component Value Date    WBC 7.7 08/08/2019    HGB 13.4 08/08/2019    HCT 39.3 08/08/2019    MCV 95.3 08/08/2019     08/08/2019     Lab Results   Component Value Date    LABA1C 5.1 03/01/2019     Lab Results   Component Value Date    EAG 99.7 03/01/2019     Lab Results   Component Value Date    LABA1C 5.1 03/01/2019     No components found for: CHLPL  No results found for: TRIG  Lab Results   Component Value Date    HDL 55 05/04/2017     Lab Results   Component Value Date    LDLCALC 77 05/04/2017     No results found for: LABVLDL    Old labs and records reviewed or requested  Discussed past lab and studies with patient        Diagnosis Orders   1. Nonintractable epilepsy without status epilepticus, unspecified epilepsy type ([facility] [address]  LEVETIRACETAM LEVEL   2. Age-related osteoporosis without current pathological fracture  Vitamin D 25 Hydroxy   3. Calculus of gallbladder without cholecystitis without obstruction     4. Hyperplastic colonic polyp, unspecified part of colon     5. High risk medication use  CBC    Comprehensive Metabolic Panel    Vitamin D 25 Hydroxy   6. Vitamin B 12 deficiency  Vitamin B12     Diagnosis seizure disorder none on Keppra. Continue. Level. Liver function test.    Osteoporosis. Vitamin D continue Fosamax PPI. No evidence of complication. Asymptomatic gallstones. Hyperplastic polyp. No need for recheck. Vitamin B12 deficiency. We will recheck. Hearing loss cerumen impaction as hearing aids it does irrigations.     Has had both Covid vaccines    No follow-ups on file.        Diagnosis and treatment discussed.   Possible side effects of medication reviewed  Patients questions answered  Follow up understood  Pt aware if they are not contacted about any test results , this does not mean they are normal.  They should call

## 2021-03-09 NOTE — TELEPHONE ENCOUNTER
From: Michael Rivas  To: Cheyenne Santiago MD  Sent: 3/9/2021 10:43 AM EST  Subject: Non-Urgent Devonna Callow Dr. Valentino Hose. I've attached Karla's Covid vaccine card for your records. I know we talked about a return visit in 6 months but I see that Karla's visit summary notes a Medicare Wellness Visit due on 5/7/21. Would you like to see Madaline Klinefelter in May for the Medicare visit and again in 6 months for routine follow up as well?   Thanks,  [employer]

## 2021-03-29 DIAGNOSIS — M81.0 AGE-RELATED OSTEOPOROSIS WITHOUT CURRENT PATHOLOGICAL FRACTURE: ICD-10-CM

## 2021-03-29 DIAGNOSIS — G40.909 NONINTRACTABLE EPILEPSY WITHOUT STATUS EPILEPTICUS, UNSPECIFIED EPILEPSY TYPE (HCC): ICD-10-CM

## 2021-03-29 DIAGNOSIS — E53.8 VITAMIN B 12 DEFICIENCY: ICD-10-CM

## 2021-03-29 DIAGNOSIS — Z79.899 HIGH RISK MEDICATION USE: ICD-10-CM

## 2021-03-29 LAB
A/G RATIO: 2 (ref 1.1–2.2)
ALBUMIN SERPL-MCNC: 4.4 G/DL (ref 3.4–5)
ALP BLD-CCNC: 69 U/L (ref 40–129)
ALT SERPL-CCNC: 27 U/L (ref 10–40)
ANION GAP SERPL CALCULATED.3IONS-SCNC: 12 MMOL/L (ref 3–16)
AST SERPL-CCNC: 25 U/L (ref 15–37)
BILIRUB SERPL-MCNC: <0.2 MG/DL (ref 0–1)
BILIRUBIN DIRECT: <0.2 MG/DL (ref 0–0.3)
BILIRUBIN, INDIRECT: NORMAL MG/DL (ref 0–1)
BUN BLDV-MCNC: 17 MG/DL (ref 7–20)
CALCIUM SERPL-MCNC: 9.1 MG/DL (ref 8.3–10.6)
CHLORIDE BLD-SCNC: 103 MMOL/L (ref 99–110)
CO2: 25 MMOL/L (ref 21–32)
CREAT SERPL-MCNC: 0.8 MG/DL (ref 0.6–1.2)
GFR AFRICAN AMERICAN: >60
GFR NON-AFRICAN AMERICAN: >60
GLOBULIN: 2.2 G/DL
GLUCOSE BLD-MCNC: 90 MG/DL (ref 70–99)
HCT VFR BLD CALC: 39.7 % (ref 36–48)
HEMOGLOBIN: 13.6 G/DL (ref 12–16)
KEPPRA DOSE AMT: NORMAL
KEPPRA: 17.9 UG/ML (ref 6–46)
MCH RBC QN AUTO: 32.4 PG (ref 26–34)
MCHC RBC AUTO-ENTMCNC: 34.4 G/DL (ref 31–36)
MCV RBC AUTO: 94.3 FL (ref 80–100)
PDW BLD-RTO: 12.9 % (ref 12.4–15.4)
PLATELET # BLD: 245 K/UL (ref 135–450)
PMV BLD AUTO: 8.4 FL (ref 5–10.5)
POTASSIUM SERPL-SCNC: 4.3 MMOL/L (ref 3.5–5.1)
RBC # BLD: 4.21 M/UL (ref 4–5.2)
SODIUM BLD-SCNC: 140 MMOL/L (ref 136–145)
TOTAL PROTEIN: 6.6 G/DL (ref 6.4–8.2)
WBC # BLD: 5.5 K/UL (ref 4–11)

## 2021-03-30 LAB
VITAMIN B-12: 346 PG/ML (ref 211–911)
VITAMIN D 25-HYDROXY: 50.9 NG/ML

## 2021-04-11 DIAGNOSIS — K21.9 GASTROESOPHAGEAL REFLUX DISEASE: ICD-10-CM

## 2021-04-11 DIAGNOSIS — M81.0 AGE RELATED OSTEOPOROSIS, UNSPECIFIED PATHOLOGICAL FRACTURE PRESENCE: ICD-10-CM

## 2021-04-12 RX ORDER — OMEPRAZOLE 20 MG/1
CAPSULE, DELAYED RELEASE ORAL
Qty: 30 CAPSULE | Refills: 0 | Status: SHIPPED | OUTPATIENT
Start: 2021-04-12 | End: 2021-06-09

## 2021-04-12 RX ORDER — FEXOFENADINE HYDROCHLORIDE 180 MG/1
TABLET, FILM COATED ORAL
Qty: 30 TABLET | Refills: 0 | Status: SHIPPED | OUTPATIENT
Start: 2021-04-12 | End: 2021-06-09

## 2021-04-12 RX ORDER — CHOLECALCIFEROL (VITAMIN D3) 50 MCG
TABLET ORAL
Qty: 30 TABLET | Refills: 0 | Status: SHIPPED | OUTPATIENT
Start: 2021-04-12 | End: 2021-06-09

## 2021-05-07 DIAGNOSIS — H61.20 IMPACTED CERUMEN, UNSPECIFIED LATERALITY: ICD-10-CM

## 2021-05-21 ENCOUNTER — TELEPHONE (OUTPATIENT)
Dept: FAMILY MEDICINE CLINIC | Age: 71
End: 2021-05-21

## 2021-05-21 NOTE — TELEPHONE ENCOUNTER
----- Message from Randi Robbins MD sent at 5/21/2021 11:51 AM EDT -----  Please reschedule her Monday apt to one with [facility]

## 2021-05-24 NOTE — TELEPHONE ENCOUNTER
VM LEFT LETTING CAREGIVER KNOW WE HAVE SOMEONE DESIGNATED TO [facility] AND TO RETURN CALL IF OK TO CHANGE OVER TO HER SCHEDULE FOR SOMETIME THIS WEEK.

## 2021-06-09 DIAGNOSIS — K21.9 GASTROESOPHAGEAL REFLUX DISEASE: ICD-10-CM

## 2021-06-09 DIAGNOSIS — M81.0 AGE RELATED OSTEOPOROSIS, UNSPECIFIED PATHOLOGICAL FRACTURE PRESENCE: ICD-10-CM

## 2021-06-09 RX ORDER — OMEPRAZOLE 20 MG/1
CAPSULE, DELAYED RELEASE ORAL
Qty: 30 CAPSULE | Refills: 0 | Status: SHIPPED | OUTPATIENT
Start: 2021-06-09 | End: 2021-07-01 | Stop reason: SDUPTHER

## 2021-06-09 RX ORDER — CHOLECALCIFEROL (VITAMIN D3) 50 MCG
TABLET ORAL
Qty: 30 TABLET | Refills: 0 | Status: SHIPPED | OUTPATIENT
Start: 2021-06-09 | End: 2021-08-05

## 2021-06-09 RX ORDER — ALENDRONATE SODIUM 70 MG/1
TABLET ORAL
Qty: 4 TABLET | Refills: 0 | Status: SHIPPED | OUTPATIENT
Start: 2021-06-09 | End: 2021-07-01 | Stop reason: SDUPTHER

## 2021-06-09 RX ORDER — FEXOFENADINE HYDROCHLORIDE 180 MG/1
TABLET, FILM COATED ORAL
Qty: 30 TABLET | Refills: 0 | Status: SHIPPED | OUTPATIENT
Start: 2021-06-09 | End: 2021-07-01 | Stop reason: SDUPTHER

## 2021-06-28 ENCOUNTER — TELEPHONE (OUTPATIENT)
Dept: FAMILY MEDICINE CLINIC | Age: 71
End: 2021-06-28

## 2021-07-01 ENCOUNTER — OFFICE VISIT (OUTPATIENT)
Dept: FAMILY MEDICINE CLINIC | Age: 71
End: 2021-07-01
Payer: MEDICARE

## 2021-07-01 VITALS
HEART RATE: 78 BPM | SYSTOLIC BLOOD PRESSURE: 130 MMHG | WEIGHT: 172 LBS | DIASTOLIC BLOOD PRESSURE: 70 MMHG | BODY MASS INDEX: 30.48 KG/M2 | OXYGEN SATURATION: 96 % | RESPIRATION RATE: 13 BRPM | HEIGHT: 63 IN

## 2021-07-01 DIAGNOSIS — F79 MENTALLY CHALLENGED: ICD-10-CM

## 2021-07-01 DIAGNOSIS — Z79.899 HIGH RISK MEDICATION USE: ICD-10-CM

## 2021-07-01 DIAGNOSIS — K63.5 HYPERPLASTIC COLONIC POLYP, UNSPECIFIED PART OF COLON: ICD-10-CM

## 2021-07-01 DIAGNOSIS — M81.0 AGE-RELATED OSTEOPOROSIS WITHOUT CURRENT PATHOLOGICAL FRACTURE: ICD-10-CM

## 2021-07-01 DIAGNOSIS — E53.8 VITAMIN B 12 DEFICIENCY: ICD-10-CM

## 2021-07-01 DIAGNOSIS — G40.909 NONINTRACTABLE EPILEPSY WITHOUT STATUS EPILEPTICUS, UNSPECIFIED EPILEPSY TYPE (HCC): Primary | ICD-10-CM

## 2021-07-01 DIAGNOSIS — E66.9 OBESITY (BMI 30.0-34.9): ICD-10-CM

## 2021-07-01 DIAGNOSIS — K80.20 CALCULUS OF GALLBLADDER WITHOUT CHOLECYSTITIS WITHOUT OBSTRUCTION: ICD-10-CM

## 2021-07-01 DIAGNOSIS — J34.89 STUFFY AND RUNNY NOSE: ICD-10-CM

## 2021-07-01 DIAGNOSIS — K59.01 SLOW TRANSIT CONSTIPATION: ICD-10-CM

## 2021-07-01 DIAGNOSIS — H91.93 BILATERAL HEARING LOSS, UNSPECIFIED HEARING LOSS TYPE: ICD-10-CM

## 2021-07-01 DIAGNOSIS — Z12.31 ENCOUNTER FOR SCREENING MAMMOGRAM FOR MALIGNANT NEOPLASM OF BREAST: ICD-10-CM

## 2021-07-01 DIAGNOSIS — Z12.39 SCREENING BREAST EXAMINATION: ICD-10-CM

## 2021-07-01 PROCEDURE — 3017F COLORECTAL CA SCREEN DOC REV: CPT | Performed by: INTERNAL MEDICINE

## 2021-07-01 PROCEDURE — G8399 PT W/DXA RESULTS DOCUMENT: HCPCS | Performed by: INTERNAL MEDICINE

## 2021-07-01 PROCEDURE — 99213 OFFICE O/P EST LOW 20 MIN: CPT | Performed by: INTERNAL MEDICINE

## 2021-07-01 PROCEDURE — 1036F TOBACCO NON-USER: CPT | Performed by: INTERNAL MEDICINE

## 2021-07-01 PROCEDURE — G8427 DOCREV CUR MEDS BY ELIG CLIN: HCPCS | Performed by: INTERNAL MEDICINE

## 2021-07-01 PROCEDURE — G8417 CALC BMI ABV UP PARAM F/U: HCPCS | Performed by: INTERNAL MEDICINE

## 2021-07-01 PROCEDURE — 1090F PRES/ABSN URINE INCON ASSESS: CPT | Performed by: INTERNAL MEDICINE

## 2021-07-01 PROCEDURE — 1123F ACP DISCUSS/DSCN MKR DOCD: CPT | Performed by: INTERNAL MEDICINE

## 2021-07-01 PROCEDURE — 4040F PNEUMOC VAC/ADMIN/RCVD: CPT | Performed by: INTERNAL MEDICINE

## 2021-07-01 RX ORDER — ALENDRONATE SODIUM 70 MG/1
TABLET ORAL
Qty: 4 TABLET | Refills: 5 | Status: SHIPPED | OUTPATIENT
Start: 2021-07-01 | End: 2021-12-27

## 2021-07-01 RX ORDER — ALCOHOL 70.47 ML/100ML
GEL TOPICAL
Qty: 31 TABLET | Refills: 5 | Status: SHIPPED | OUTPATIENT
Start: 2021-07-01

## 2021-07-01 RX ORDER — OMEPRAZOLE 20 MG/1
CAPSULE, DELAYED RELEASE ORAL
Qty: 30 CAPSULE | Refills: 5 | Status: SHIPPED | OUTPATIENT
Start: 2021-07-01 | End: 2021-12-27

## 2021-07-01 RX ORDER — POLYETHYLENE GLYCOL 3350 17 G/17G
POWDER, FOR SOLUTION ORAL
Qty: 100 EACH | Refills: 0 | Status: SHIPPED | OUTPATIENT
Start: 2021-07-01

## 2021-07-01 RX ORDER — LANOLIN ALCOHOL/MO/W.PET/CERES
CREAM (GRAM) TOPICAL
Qty: 30 TABLET | Refills: 5 | Status: SHIPPED | OUTPATIENT
Start: 2021-07-01

## 2021-07-01 RX ORDER — FEXOFENADINE HCL 180 MG/1
TABLET ORAL
Qty: 30 TABLET | Refills: 5 | Status: SHIPPED | OUTPATIENT
Start: 2021-07-01 | End: 2021-12-27

## 2021-07-01 NOTE — PROGRESS NOTES
HPI: Mercedes Sims presents for follow-up. chronic health issues include asymptomatic cholelithiasis, mental challenged, hearing loss, speech abnormality, seizure disorder, osteoporosis, hyperplastic polyp, constipation, high risk medication use.     Improvement with ongoing speech therapy on thursdays.  Wears hearing aids. Cerumenex every 2 weeks. Doing better.     Mentally challenged and living with her caretaker.  Neurology note from 2007 states possible birth hypoxia she is unable to read or write and did not attend school.  History of partial complex seizures.  Currently on Keppra.  Neurology appointment August 2020 with follow-up in a year normal liver function 13 months ago. No recurrent seizures. Therapeutic level.     Remote diagnosis of asthma. Using no medication currently. Normal pulmonary function tests in the past. Symbicort and albuterol discontinued without difficulty. Albuterol at home if needed. Has not used in several months      constipation. Does well with MiraLAX. Asymptomatic gallstone. No abdominal complaints     Density improved on 8/2018 DEXA scan with T-score of -2.3 bone density with vitamin D and Fosamax. No GI complaints positive PPI. Black stools. Mammogram BI-RADS 2 September 2019      Past hospitalization:   Salpingo-oophorectomy bilaterally for benign mass.      Social history: No tobacco. Rare alcohol. . No domestic violence . [employer] retired . Lives with caretaker.    Not sexually active. enjoys puzzles. Has a cat.      Family history hypertension, unknown breast or colon cancer      Review of systems: Hearing loss. Challenge. No falls. Feels safe. Hearing deficit. Hearing aids. Speech impediment. Chronic constipation. No current wheeze or shortness of breath denies chest pain or palpitations. No breast masses or discharge. BI-RADS 1 mammogram 2018. Positive constipation. No dysuria. Amenorrheic.   Hysterectomy oophorectomy bilaterally for benign mass.  Recurrent cerumen impaction. Happy. BMI 30. No complaints of knee pain GE reflux or apnea     Constitutional, ent, CV, respiratory, GI, , joint, skin, allergic and psychiatric ROS reviewed and negative except for above    No Known Allergies    Outpatient Medications Marked as Taking for the 7/1/21 encounter (Office Visit) with Betzaida Beasley MD   Medication Sig Dispense Refill    omeprazole (PRILOSEC) 20 MG delayed release capsule TAKE 1 CAPSULE BY MOUTH ONCE DAILY FOR GERD. 30 capsule 5    fexofenadine (ALLERGY RELIEF) 180 MG tablet TAKE ONE TABLET BY MOUTH ONCE DAILY FOR ALLERGIES. 30 tablet 5    alendronate (FOSAMAX) 70 MG tablet 1 po q week 4 tablet 5    vitamin B-12 (CYANOCOBALAMIN) 1000 MCG tablet TAKE ONE TABLET ONCE DAILY: VITAMIN 30 tablet 5    Multiple Vitamin (THEREMS) TABS TAKE ONE TABLET BY MOUTH ONCE DAILY -VITAMIN SUPPLEMENT 31 tablet 5    polyethylene glycol (MIRALAX) 17 g PACK packet 17 grams once daily for constipation 100 each 0    vitamin D (CHOLECALCIFEROL) 50 MCG (2000 UT) TABS tablet TAKE ONE TABLET BY MOUTH ONCE A DAY.  SUPPLEMENT 30 tablet 0    escitalopram (LEXAPRO) 20 MG tablet TAKE ONE TABLET BY MOUTH ONCE DAILY FOR MOOD 31 tablet 3    meloxicam (MOBIC) 7.5 MG tablet 1 po up to daily for pain 30 tablet 5    levETIRAcetam (KEPPRA) 500 MG tablet TAKE ONE TABLET BY MOUTH TWICE DAILY FOR SEIZURES 60 tablet 0             Past Medical History:   Diagnosis Date    Age-related osteoporosis without current pathological fracture 7/10/2017    7.2017 -3 spine -2.2 hip vit D 77 fosamax    Asthma     Calculus of gallbladder without cholecystitis without obstruction 8/8/2019    Constipation     Cough 2/5/2018    FEV1/FVC 79% post 80 % FEV1 2.31 ( 73%) restriction on change with bronchodilators    Depression     Gastroesophageal reflux     Hypertension     Mental retardation, moderate (I.Q. 35-49)     Mentally challenged 8/8/2019    Seizure disorder ([facility] [address]     Shortness of breath 8/16/2017    ? asthma    Slow transit constipation 8/16/2017       Past Surgical History:   Procedure Laterality Date    COLONOSCOPY      HYSTERECTOMY      HYSTERECTOMY, VAGINAL      SALPINGO-OOPHORECTOMY Bilateral     beningn mass           Objective     Pulse 78   Ht 5' 3\" (1.6 m)   Wt 172 lb (78 kg)   SpO2 96%   BMI 30.47 kg/m²     @LASTSAO2(3)@    Wt Readings from Last 3 Encounters:   08/20/19 158 lb (71.7 kg)   08/08/19 153 lb (69.4 kg)   02/04/19 154 lb (69.9 kg)       Physical Exam     NAD alert and cooperative  HEENT: TMs are unremarkable. Hearing aids. Decreased hearing. Pink conjunctive a. Good upstroke of carotids no bruits. No adenopathy. Lungs are clear no wheezes rales or rhonchi. Breast without any dominant masses discharge or nipple indentation. Cardiovascular exam regular rate and rhythm no murmur click. Abdomen is benign no hepatosplenomegaly epigastric tenderness or mass. Good pulses lower extremities  No suspicious skin lesions or nodules.     Chemistry        Component Value Date/Time     03/29/2021 1355    K 4.3 03/29/2021 1355     03/29/2021 1355    CO2 25 03/29/2021 1355    BUN 17 03/29/2021 1355    CREATININE 0.8 03/29/2021 1355        Component Value Date/Time    CALCIUM 9.1 03/29/2021 1355    ALKPHOS 69 03/29/2021 1355    AST 25 03/29/2021 1355    ALT 27 03/29/2021 1355    BILITOT <0.2 03/29/2021 1355            Lab Results   Component Value Date    WBC 5.5 03/29/2021    HGB 13.6 03/29/2021    HCT 39.7 03/29/2021    MCV 94.3 03/29/2021     03/29/2021     Lab Results   Component Value Date    LABA1C 5.1 03/01/2019     Lab Results   Component Value Date    EAG 99.7 03/01/2019     Lab Results   Component Value Date    LABA1C 5.1 03/01/2019     No components found for: CHLPL  No results found for: TRIG  Lab Results   Component Value Date    HDL 55 05/04/2017     Lab Results   Component Value Date    LDLCALC 77 05/04/2017     No results found for: LABVLDL    Old labs and records reviewed or requested  Discussed past lab and studies with patient      Diagnosis Orders   1. Nonintractable epilepsy without status epilepticus, unspecified epilepsy type ([facility] [address]     2. Gastroesophageal reflux disease  omeprazole (PRILOSEC) 20 MG delayed release capsule   3. Age related osteoporosis, unspecified pathological fracture presence  alendronate (FOSAMAX) 70 MG tablet   4. Age-related osteoporosis without current pathological fracture  Multiple Vitamin (THEREMS) TABS   5. Mentally challenged     6. Slow transit constipation     7. Vitamin B 12 deficiency     8. Hyperplastic colonic polyp, unspecified part of colon     9. Calculus of gallbladder without cholecystitis without obstruction     10. Bilateral hearing loss, unspecified hearing loss type     11. High risk medication use     12. Obesity (BMI 30.0-34.9)     13. Screening breast examination  NAUN DIGITAL SCREEN W OR WO CAD BILATERAL   14. Encounter for screening mammogram for malignant neoplasm of breast   NAUN DIGITAL SCREEN W OR WO CAD BILATERAL   15. Osteopenia, unspecified location  DEXA BONE DENSITY AXIAL SKELETON   16. Other specified disorders of bone density and structure, other site   DEXA BONE DENSITY AXIAL SKELETON     Epilepsy doing well. Continue current medication follow-up with neurology yearly. GE reflux not an issue frequently however uses as needed omeprazole. Osteoporosis. Bone density in September along with her mammogram.  Continue Fosamax vitamin D. Mentally challenged doing well. Is safe    Constipation not an issue on her MiraLAX as needed. Speech impediment doing well with her speech therapy. Follow-up 6 months              No follow-ups on file. Diagnosis and treatment discussed.   Possible side effects of medication reviewed  Patients questions answered  Follow up understood  Pt aware if they are not contacted about any test results , this does not mean they are normal. They should call  Hyperplastic polyp recheck in

## 2021-07-01 NOTE — PATIENT INSTRUCTIONS
Increase ear wash to weekly  Sept schedule mammogram and bone density  Cut back on calories a bit  Patient Education

## 2021-08-04 DIAGNOSIS — M81.0 AGE RELATED OSTEOPOROSIS, UNSPECIFIED PATHOLOGICAL FRACTURE PRESENCE: ICD-10-CM

## 2021-08-05 RX ORDER — CHOLECALCIFEROL (VITAMIN D3) 50 MCG
TABLET ORAL
Qty: 31 TABLET | Refills: 0 | Status: SHIPPED | OUTPATIENT
Start: 2021-08-05 | End: 2022-08-09

## 2021-08-30 ENCOUNTER — OFFICE VISIT (OUTPATIENT)
Dept: FAMILY MEDICINE CLINIC | Age: 71
End: 2021-08-30
Payer: MEDICARE

## 2021-08-30 VITALS
BODY MASS INDEX: 30.11 KG/M2 | WEIGHT: 170 LBS | DIASTOLIC BLOOD PRESSURE: 73 MMHG | HEART RATE: 70 BPM | SYSTOLIC BLOOD PRESSURE: 138 MMHG | OXYGEN SATURATION: 97 %

## 2021-08-30 DIAGNOSIS — E53.8 VITAMIN B 12 DEFICIENCY: ICD-10-CM

## 2021-08-30 DIAGNOSIS — G40.909 NONINTRACTABLE EPILEPSY WITHOUT STATUS EPILEPTICUS, UNSPECIFIED EPILEPSY TYPE (HCC): Primary | ICD-10-CM

## 2021-08-30 DIAGNOSIS — F79 MENTALLY CHALLENGED: ICD-10-CM

## 2021-08-30 DIAGNOSIS — Z00.00 ROUTINE GENERAL MEDICAL EXAMINATION AT A HEALTH CARE FACILITY: ICD-10-CM

## 2021-08-30 DIAGNOSIS — Z71.89 ACP (ADVANCE CARE PLANNING): ICD-10-CM

## 2021-08-30 PROCEDURE — G0438 PPPS, INITIAL VISIT: HCPCS | Performed by: NURSE PRACTITIONER

## 2021-08-30 PROCEDURE — 1123F ACP DISCUSS/DSCN MKR DOCD: CPT | Performed by: NURSE PRACTITIONER

## 2021-08-30 PROCEDURE — 3017F COLORECTAL CA SCREEN DOC REV: CPT | Performed by: NURSE PRACTITIONER

## 2021-08-30 PROCEDURE — 99497 ADVNCD CARE PLAN 30 MIN: CPT | Performed by: NURSE PRACTITIONER

## 2021-08-30 PROCEDURE — 4040F PNEUMOC VAC/ADMIN/RCVD: CPT | Performed by: NURSE PRACTITIONER

## 2021-08-30 ASSESSMENT — PATIENT HEALTH QUESTIONNAIRE - PHQ9
SUM OF ALL RESPONSES TO PHQ9 QUESTIONS 1 & 2: 0
2. FEELING DOWN, DEPRESSED OR HOPELESS: 0
SUM OF ALL RESPONSES TO PHQ QUESTIONS 1-9: 0
1. LITTLE INTEREST OR PLEASURE IN DOING THINGS: 0

## 2021-08-30 ASSESSMENT — LIFESTYLE VARIABLES: HOW OFTEN DO YOU HAVE A DRINK CONTAINING ALCOHOL: 0

## 2021-08-31 NOTE — PROGRESS NOTES
Medicare Annual Wellness Visit  Name: Lunda Snellen Date: 2021   MRN: [id number] Sex: Female   Age: 66 y.o. Ethnicity: Non- / Non    : 1950 Race: White (non-)      Bharati Day is here for Medicare AWV    Screenings for behavioral, psychosocial and functional/safety risks, and cognitive dysfunction are all negative except as indicated below. These results, as well as other patient data from the [address] form, are documented in Flowsheets linked to this Encounter. No Known Allergies      Prior to Visit Medications    Medication Sig Taking? Authorizing Provider   vitamin D (CHOLECALCIFEROL) 50 MCG (2000) TABS tablet TAKE ONE TABLET BY MOUTH ONCE A DAY. Tricia Han MD   omeprazole (PRILOSEC) 20 MG delayed release capsule TAKE 1 CAPSULE BY MOUTH ONCE DAILY FOR GERD. Shantell August MD   fexofenadine (ALLERGY RELIEF) 180 MG tablet TAKE ONE TABLET BY MOUTH ONCE DAILY FOR ALLERGIES.   Shantell August MD   alendronate (FOSAMAX) 70 MG tablet 1 po q week  Shantell August MD   vitamin B-12 (CYANOCOBALAMIN) 1000 MCG tablet TAKE ONE TABLET ONCE DAILY: VITAMIN  Shantell August MD   Multiple Vitamin (THEREMS) TABS TAKE ONE TABLET BY MOUTH ONCE DAILY -VITAMIN SUPPLEMENT  Shantell August MD   polyethylene glycol (MIRALAX) 17 g PACK packet 17 grams once daily for constipation  Shantell August MD   carbamide peroxide (DEBROX) 6.5 % otic solution Place 2 drops in each ear every two weeks  Sade Tellez MD   escitalopram (LEXAPRO) 20 MG tablet TAKE ONE TABLET BY MOUTH ONCE DAILY FOR MOOD  Shantell August MD   VENTOLIN  (90 Base) MCG/ACT inhaler INHALE 2 PUFFS INTO THE LUNGS EVERY 6 HOURS AS NEEDED FOR WHEEZING  Shantell August MD   meloxicam (MOBIC) 7.5 MG tablet 1 po up to daily for pain  Shantell August MD   levETIRAcetam (KEPPRA) 500 MG tablet TAKE ONE TABLET BY MOUTH TWICE DAILY FOR SEIZURES  Shantell August MD Patient-Reported Systolic 990   Patient-Reported Diastolic 80   Patient-Reported Pulse 76   Patient-Reported Temperature 98.6   Patient-Reported SpO2 94      Body mass index is 30.11 kg/m². Based upon direct observation of the patient, evaluation of cognition reveals global memory impairment noted- patient has learning disabilities. Patient's complete Health Risk Assessment and screening values have been reviewed and are found in Flowsheets. The following problems were reviewed today and where indicated follow up appointments were made and/or referrals ordered. Positive Risk Factor Screenings with Interventions:      Cognitive:  Clock Drawing Test (CDT) Score: (!) Abnormal  Total Score Interpretation: Positive Mini-Cog  Cognitive Impairment Interventions:  · Patient declines any further evaluation/treatment for cognitive impairment         General Health and ACP:  General  In general, how would you say your health is?: Very Good  In the past 7 days, have you experienced any of the following?  New or Increased Pain, New or Increased Fatigue, Loneliness, Social Isolation, Stress or Anger?: None of These  Do you get the social and emotional support that you need?: Yes  Do you have a Living Will?: (!) No  Advance Directives     Power of [address] [place] [address] Will ACP-Advance Directive ACP-Power of     Not on File Not on File Not on File Not on File      General Health Risk Interventions:  · No Living Will: Advance Care Planning addressed with patient today    Health Habits/Nutrition:  Health Habits/Nutrition  Do you exercise for at least 20 minutes 2-3 times per week?: (!) No  Have you lost any weight without trying in the past 3 months?: No  Do you eat only one meal per day?: No  Have you seen the dentist within the past year?: Yes     Health Habits/Nutrition Interventions:  · Inadequate physical activity:  patient is not ready to increase his/her physical activity level at this time    Hearing/Vision:  No exam data present  Hearing/Vision  Do you or your family notice any trouble with your hearing that hasn't been managed with hearing aids?: (!) Yes (Patient wears hearing aids.)  Do you have difficulty driving, watching TV, or doing any of your daily activities because of your eyesight?: (!) Yes (Patient wears glassed and was just to the eye doctor recently.)  Have you had an eye exam within the past year?: Yes  Hearing/Vision Interventions:  · Hearing concerns:  wears hearing aids  · Vision concerns:  wears glasses     ADL:  ADLs  In the past 7 days, did you need help from others to perform any of the following everyday activities? Eating, dressing, grooming, bathing, toileting, or walking/balance?: None  In the past 7 days, did you need help from others to take care of any of the following?  Laundry, housekeeping, banking/finances, shopping, telephone use, food preparation, transportation, or taking medications?: (!) Transportation, Taking Medications, Banking/Finances  ADL Interventions:  · Patient declines any further evaluation/treatment for this issue  · Patient lives with her caretaker who provides all the above support    Personalized Preventive Plan   Current Health Maintenance Status  Immunization History   Administered Date(s) Administered    COVID-19, Pfizer, PF, 30mcg/0.3mL 02/01/2021, 03/01/2021    Influenza Virus Vaccine 09/16/2016    Influenza, High Dose (Fluzone 65 yrs and older) 10/02/2017, 03/31/2021    Influenza, Pan Half, Recombinant, IM PF (Flublok 18 yrs and older) 10/19/2018    Influenza, Triv, inactivated, subunit, adjuvanted, IM (Fluad 65 yrs and older) 11/01/2019    Pneumococcal Conjugate 13-valent ([id number]) 02/26/2015    Pneumococcal Polysaccharide ([id number]) 02/05/2018    Td, unspecified formulation 02/23/2012, 02/13/2014    Tdap (Boostrix, Adacel) 10/19/2018    Zoster Recombinant (Shingrix) 03/19/2019, 08/07/2019    Zoster Vaccine 08/07/2019 Health Maintenance   Topic Date Due    Annual Wellness Visit (AWV)  05/07/2021    Breast cancer screen  09/06/2021    Flu vaccine (1) 09/01/2021    Lipid screen  05/04/2022    Colon cancer screen colonoscopy  09/21/2026    DTaP/Tdap/Td vaccine (2 - Td or Tdap) 10/19/2028    DEXA (modify frequency per FRAX score)  Completed    Shingles Vaccine  Completed    Pneumococcal 65+ years Vaccine  Completed    COVID-19 Vaccine  Completed    Hepatitis C screen  Completed    Hepatitis A vaccine  Aged Out    Hepatitis B vaccine  Aged Out    Hib vaccine  Aged Out    Meningococcal (ACWY) vaccine  Aged Out     Recommendations for [employer] Due: see orders and patient instructions/AVS.  . Recommended screening schedule for the next 5-10 years is provided to the patient in written form: see Patient Instructions/AVS.    Hanley Essex was seen today for medicare awv. Diagnoses and all orders for this visit:    Nonintractable epilepsy without status epilepticus, unspecified epilepsy type ([facility] [address]  On keppra. ACP (advance care planning)  -     [place] ADVANCED CARE PLAN FACE TO FACE, 1ST 30MIN [id number]    Routine general medical examination at a health care facility    Mentally challenged  Patient receives support in the community and lives with her caretaker. Vitamin B 12 deficiency  On supplement. Bharati Day is a 66 y.o. female being evaluated by a Virtual Visit (video and audio) encounter to address concerns as mentioned above. A caregiver was present when appropriate. Due to this being a TeleHealth encounter (During JOUYJ-41 public health emergency), evaluation of the following organ systems was limited: Vitals/Constitutional/EENT/Resp/CV/GI//MS/Neuro/Skin/Heme-Lymph-Imm.   Pursuant to the emergency declaration under the [address], [address] [place] [address] authority and the [employer] Act, this Virtual Visit was

## 2021-08-31 NOTE — PATIENT INSTRUCTIONS
Personalized Preventive Plan for Glenn Dad - 8/30/2021  Medicare offers a range of preventive health benefits. Some of the tests and screenings are paid in full while other may be subject to a deductible, co-insurance, and/or copay. Some of these benefits include a comprehensive review of your medical history including lifestyle, illnesses that may run in your family, and various assessments and screenings as appropriate. After reviewing your medical record and screening and assessments performed today your provider may have ordered immunizations, labs, imaging, and/or referrals for you. A list of these orders (if applicable) as well as your Preventive Care list are included within your After Visit Summary for your review. Other Preventive Recommendations:    · A preventive eye exam performed by an eye specialist is recommended every 1-2 years to screen for glaucoma; cataracts, macular degeneration, and other eye disorders. · A preventive dental visit is recommended every 6 months. · Try to get at least 150 minutes of exercise per week or 10,000 steps per day on a pedometer . · Order or download the FREE \"Exercise & Physical Activity: Your Everyday Guide\" from [employer]. Call 5-191.668.8942 or search [employer] online. · You need 4397-9441 mg of calcium and 7572-8717 IU of vitamin D per day. It is possible to meet your calcium requirement with diet alone, but a vitamin D supplement is usually necessary to meet this goal.  · When exposed to the sun, use a sunscreen that protects against both UVA and UVB radiation with an SPF of 30 or greater. Reapply every 2 to 3 hours or after sweating, drying off with a towel, or swimming. · Always wear a seat belt when traveling in a car. Always wear a helmet when riding a bicycle or motorcycle.

## 2021-09-22 ENCOUNTER — HOSPITAL ENCOUNTER (OUTPATIENT)
Dept: GENERAL RADIOLOGY | Age: 71
Discharge: HOME | End: 2021-09-22
Payer: MEDICARE

## 2021-09-22 ENCOUNTER — HOSPITAL ENCOUNTER (OUTPATIENT)
Dept: WOMENS IMAGING | Age: 71
Discharge: HOME | End: 2021-09-22
Payer: MEDICARE

## 2021-09-22 VITALS — BODY MASS INDEX: 26.68 KG/M2 | WEIGHT: 170 LBS | HEIGHT: 67 IN

## 2021-09-22 DIAGNOSIS — Z12.39 SCREENING BREAST EXAMINATION: ICD-10-CM

## 2021-09-22 DIAGNOSIS — M81.8 OTHER OSTEOPOROSIS WITHOUT CURRENT PATHOLOGICAL FRACTURE: Primary | ICD-10-CM

## 2021-09-22 DIAGNOSIS — M81.0 AGE-RELATED OSTEOPOROSIS WITHOUT CURRENT PATHOLOGICAL FRACTURE: ICD-10-CM

## 2021-09-22 DIAGNOSIS — Z12.31 ENCOUNTER FOR SCREENING MAMMOGRAM FOR MALIGNANT NEOPLASM OF BREAST: ICD-10-CM

## 2021-09-22 PROCEDURE — 77080 DXA BONE DENSITY AXIAL: CPT

## 2021-09-22 PROCEDURE — 77067 SCR MAMMO BI INCL CAD: CPT

## 2021-12-07 ENCOUNTER — OFFICE VISIT (OUTPATIENT)
Dept: ENT CLINIC | Age: 71
End: 2021-12-07
Payer: MEDICARE

## 2021-12-07 VITALS — WEIGHT: 179 LBS | BODY MASS INDEX: 28.09 KG/M2 | HEIGHT: 67 IN

## 2021-12-07 DIAGNOSIS — H61.23 BILATERAL IMPACTED CERUMEN: ICD-10-CM

## 2021-12-07 DIAGNOSIS — Z97.4 USES HEARING AID: ICD-10-CM

## 2021-12-07 DIAGNOSIS — H91.93 BILATERAL HEARING LOSS, UNSPECIFIED HEARING LOSS TYPE: Primary | ICD-10-CM

## 2021-12-07 DIAGNOSIS — Q16.1 CONGENITAL NARROWING OF EXTERNAL AUDITORY CANAL: ICD-10-CM

## 2021-12-07 DIAGNOSIS — H93.8X3 SENSATION OF FULLNESS IN BOTH EARS: ICD-10-CM

## 2021-12-07 PROCEDURE — G8399 PT W/DXA RESULTS DOCUMENT: HCPCS | Performed by: STUDENT IN AN ORGANIZED HEALTH CARE EDUCATION/TRAINING PROGRAM

## 2021-12-07 PROCEDURE — 99213 OFFICE O/P EST LOW 20 MIN: CPT | Performed by: STUDENT IN AN ORGANIZED HEALTH CARE EDUCATION/TRAINING PROGRAM

## 2021-12-07 PROCEDURE — 1090F PRES/ABSN URINE INCON ASSESS: CPT | Performed by: STUDENT IN AN ORGANIZED HEALTH CARE EDUCATION/TRAINING PROGRAM

## 2021-12-07 PROCEDURE — 1036F TOBACCO NON-USER: CPT | Performed by: STUDENT IN AN ORGANIZED HEALTH CARE EDUCATION/TRAINING PROGRAM

## 2021-12-07 PROCEDURE — G8427 DOCREV CUR MEDS BY ELIG CLIN: HCPCS | Performed by: STUDENT IN AN ORGANIZED HEALTH CARE EDUCATION/TRAINING PROGRAM

## 2021-12-07 PROCEDURE — 4040F PNEUMOC VAC/ADMIN/RCVD: CPT | Performed by: STUDENT IN AN ORGANIZED HEALTH CARE EDUCATION/TRAINING PROGRAM

## 2021-12-07 PROCEDURE — G8484 FLU IMMUNIZE NO ADMIN: HCPCS | Performed by: STUDENT IN AN ORGANIZED HEALTH CARE EDUCATION/TRAINING PROGRAM

## 2021-12-07 PROCEDURE — G8417 CALC BMI ABV UP PARAM F/U: HCPCS | Performed by: STUDENT IN AN ORGANIZED HEALTH CARE EDUCATION/TRAINING PROGRAM

## 2021-12-07 PROCEDURE — 1123F ACP DISCUSS/DSCN MKR DOCD: CPT | Performed by: STUDENT IN AN ORGANIZED HEALTH CARE EDUCATION/TRAINING PROGRAM

## 2021-12-07 PROCEDURE — 3017F COLORECTAL CA SCREEN DOC REV: CPT | Performed by: STUDENT IN AN ORGANIZED HEALTH CARE EDUCATION/TRAINING PROGRAM

## 2021-12-07 PROCEDURE — 69210 REMOVE IMPACTED EAR WAX UNI: CPT | Performed by: STUDENT IN AN ORGANIZED HEALTH CARE EDUCATION/TRAINING PROGRAM

## 2021-12-08 NOTE — PROGRESS NOTES
[facility]  1950[facility] is a 66 y.o. female, here for evaluation of the following chief complaint(s):  Cerumen Impaction      ASSESSMENT/PLAN:  1. Bilateral hearing loss, unspecified hearing loss type  2. Bilateral impacted cerumen      This is a very pleasant 66 y.o. female here today for evaluation of the the above-noted complaints. Patient has a history of bilateral sensorineural hearing loss and is currently aided. She was having some issues with her hearing aids so I asked them to follow-up with the prescribing audiologist to have her hearing aids evaluated. Regarding the patient's cerumen impaction, I was able to successfully remove this in clinic. I have asked the patient to use carbamide peroxidase for 3 to 4 days once a month to help prevent her cerumen impaction. SUBJECTIVE/OBJECTIVE:  [facility]    [facility] is here today for evaluation of issues related to a clogged ear. She feels like both of her ears are stopped up. She does wear hearing aids but thinks that there is something going on with them. She is accompanied by a care provider. She denies any ear drainage or pain. She has had her ears cleaned out in the past.    REVIEW OF SYSTEMS  The following systems were reviewed and revealed the following in addition to any already discussed in the HPI:    PHYSICAL EXAM    GENERAL: No acute distress, alert and oriented, no hoarseness, strong voice  EYES: EOMI, Anti-icteric  HENT:   Head: Normocephalic and atraumatic.    Face:  Symmetric, facial nerve intact, no sinus tenderness  Right Ear: Normal external ear, normal external auditory canal, intact tympanic membrane with normal mobility and aerated middle ear  Left Ear: Normal external ear, normal external auditory canal, intact tympanic membrane with normal mobility and aerated middle ear  Mouth/Oral Cavity:  normal lips, Uvula is midline, no mucosal

## 2021-12-09 ENCOUNTER — OFFICE VISIT (OUTPATIENT)
Dept: RHEUMATOLOGY | Age: 71
End: 2021-12-09
Payer: MEDICARE

## 2021-12-09 ENCOUNTER — TELEPHONE (OUTPATIENT)
Dept: RHEUMATOLOGY | Age: 71
End: 2021-12-09

## 2021-12-09 VITALS
BODY MASS INDEX: 28.25 KG/M2 | WEIGHT: 180.4 LBS | SYSTOLIC BLOOD PRESSURE: 127 MMHG | DIASTOLIC BLOOD PRESSURE: 75 MMHG | HEART RATE: 82 BPM

## 2021-12-09 DIAGNOSIS — R53.83 OTHER FATIGUE: ICD-10-CM

## 2021-12-09 DIAGNOSIS — M81.0 AGE-RELATED OSTEOPOROSIS WITHOUT CURRENT PATHOLOGICAL FRACTURE: Primary | ICD-10-CM

## 2021-12-09 PROCEDURE — G8399 PT W/DXA RESULTS DOCUMENT: HCPCS | Performed by: INTERNAL MEDICINE

## 2021-12-09 PROCEDURE — 1090F PRES/ABSN URINE INCON ASSESS: CPT | Performed by: INTERNAL MEDICINE

## 2021-12-09 PROCEDURE — 1036F TOBACCO NON-USER: CPT | Performed by: INTERNAL MEDICINE

## 2021-12-09 PROCEDURE — 1123F ACP DISCUSS/DSCN MKR DOCD: CPT | Performed by: INTERNAL MEDICINE

## 2021-12-09 PROCEDURE — G8484 FLU IMMUNIZE NO ADMIN: HCPCS | Performed by: INTERNAL MEDICINE

## 2021-12-09 PROCEDURE — 4040F PNEUMOC VAC/ADMIN/RCVD: CPT | Performed by: INTERNAL MEDICINE

## 2021-12-09 PROCEDURE — G8417 CALC BMI ABV UP PARAM F/U: HCPCS | Performed by: INTERNAL MEDICINE

## 2021-12-09 PROCEDURE — 3017F COLORECTAL CA SCREEN DOC REV: CPT | Performed by: INTERNAL MEDICINE

## 2021-12-09 PROCEDURE — G8427 DOCREV CUR MEDS BY ELIG CLIN: HCPCS | Performed by: INTERNAL MEDICINE

## 2021-12-09 PROCEDURE — 99204 OFFICE O/P NEW MOD 45 MIN: CPT | Performed by: INTERNAL MEDICINE

## 2021-12-09 NOTE — LETTER
[facility]  [address] [facility] [address] [id number]  Phone: [phone]  Fax: [phone]    Ray Diaz MD    December 9, 2021     Derick Hill MD  [address] [address]    Patient: Deven Robles   MR Number: [id number]   YOB: 1950   Date of Visit: 12/9/2021       Dear Thom Humphrey: Thank you for referring Lawanda Wu to me for evaluation/treatment. Below are the relevant portions of my assessment and plan of care. If you have questions, please do not hesitate to call me. I look forward to following Carlyn Acuna along with you.     Sincerely,      Ray Diaz MD

## 2021-12-09 NOTE — PROGRESS NOTES
2021  Patient Name: Preston Edmondson  : 1950  Medical Record: [id number]      ASSESSMENT AND PLAN    Assessment/Plan:      ASSESSMENT:    1. Age-related osteoporosis without current pathological fracture    2. Other fatigue        PLAN:     Viv Thomas was seen today for establish care. Diagnoses and all orders for this visit:    Age-related osteoporosis without current pathological fracture  -     CBC Auto Differential; Future  -     Comprehensive Metabolic Panel; Future  -     PTH, Intact; Future  -     TSH WITH REFLEX TO FT4; Future  -     Vitamin D 25 Hydroxy; Future    Other fatigue  -     CBC Auto Differential; Future  -     TSH WITH REFLEX TO FT4; Future  -     denosumab (PROLIA) 60 MG/ML SOSY SC injection; Inject 1 mL into the skin once for 1 dose    Osteoporosis-  She was diagnosed with osteoporosis in . She had a DEXA scan 2017 which showed T score of -3 at the lumbar spine and -2.2 at the left femoral neck.   She was placed on Fosamax and she has been on Fosamax since 2017  DEXA scan in 2018 showed T score of -2.3 at the lumbar spine and -1.9 at the left femoral neck with slight improvement compared to previous DEXA scan in     She had repeat DEXA scan on 2021 which showed T score of -2.8 at the lumbar spine and -2.5 at the left femoral neck  Bone density has decreased from DEXA scan in 2018 but it was done at a different place so comparison is not ideal  Since she has been on Fosamax for at least 5 years she will need a drug holiday  I will discontinue Fosamax and start Prolia subcu injections every 6 months  She will continue calcium with vitamin D supplements  Weightbearing exercises  Fall precautions  I will also check vitamin D, TSH, PTH to rule out secondary causes      Side effects of prolIA include hypo-/hypercalcemia, hypersensitivity reaction, infections, atypical femur fractures, osteonecrosis of the jaw and were explained to the patient in detail    The patient indicates understanding of these issues and agrees with the plan. Return in about 8 weeks (around 2/3/2022). The risks and benefits of my recommendations, as well as other treatment options, benefits and side effects werediscussed with the patient. All questions were answered. I reviewed patient's history, referral documents and electronic medical records  Copy of consult note is being routedelectronically/faxed to referring physician         MEDICATIONS  Current Outpatient Medications   Medication Sig Dispense Refill    denosumab (PROLIA) 60 MG/ML SOSY SC injection Inject 1 mL into the skin once for 1 dose 1 mL 0    escitalopram (LEXAPRO) 20 MG tablet TAKE ONE TABLET BY MOUTH ONCE DAILY FOR MOOD 31 tablet 5    vitamin D (CHOLECALCIFEROL) 50 MCG (2000 UT) TABS tablet TAKE ONE TABLET BY MOUTH ONCE A DAY. SUPPLEMENT 31 tablet 0    omeprazole (PRILOSEC) 20 MG delayed release capsule TAKE 1 CAPSULE BY MOUTH ONCE DAILY FOR GERD. 30 capsule 5    fexofenadine (ALLERGY RELIEF) 180 MG tablet TAKE ONE TABLET BY MOUTH ONCE DAILY FOR ALLERGIES.  30 tablet 5    alendronate (FOSAMAX) 70 MG tablet 1 po q week 4 tablet 5    vitamin B-12 (CYANOCOBALAMIN) 1000 MCG tablet TAKE ONE TABLET ONCE DAILY: VITAMIN 30 tablet 5    Multiple Vitamin (THEREMS) TABS TAKE ONE TABLET BY MOUTH ONCE DAILY -VITAMIN SUPPLEMENT 31 tablet 5    polyethylene glycol (MIRALAX) 17 g PACK packet 17 grams once daily for constipation 100 each 0    carbamide peroxide (DEBROX) 6.5 % otic solution Place 2 drops in each ear every two weeks 1 Bottle 0    VENTOLIN  (90 Base) MCG/ACT inhaler INHALE 2 PUFFS INTO THE LUNGS EVERY 6 HOURS AS NEEDED FOR WHEEZING 1 Inhaler 0    meloxicam (MOBIC) 7.5 MG tablet 1 po up to daily for pain 30 tablet 5    levETIRAcetam (KEPPRA) 500 MG tablet TAKE ONE TABLET BY MOUTH TWICE DAILY FOR SEIZURES 60 tablet 0    pseudoephedrine (SUDAFED) 30 MG tablet Take 30 mg by mouth every 4 hours as needed for Congestion 1-2 Every 6 hours as needed for cold symptoms (Sneezing, running nose, nasal congestion)      acetaminophen (TYLENOL) 325 MG tablet Take 325 mg by mouth Take 2 tablets every by mouth every 4 hours as needed for minor pain or fever over 101 degrees.  SUNSCREEN SPF50 EX Apply topically Apply as needed to protect skin from from sunburn.  Multiple Vitamins-Minerals (THERAPEUTIC MULTIVITAMIN-MINERALS) tablet Take 1 tablet by mouth daily. No current facility-administered medications for this visit. ALLERGIES  No Known Allergies      Comments  No specialty comments available. Da Hall MD    HISTORY OF PRESENT ILLNESS  Lorena Mendez is a 66 y.o. female with past medical history of osteoporosis, mental retardation, seizure disorder, asthma osteoporosis, bilateral hearing loss, anxiety, depression, hypertension who is being seen for follow up evaluation of  osteoporosis. She was diagnosed with osteoporosis in 2017. She had bone density in 2017 that showed T score of -3 at the lumbar spine. She was placed on Fosamax 70 mg once a week. She has been on Fosamax since August 2017. She had repeat bone density test in 2018 that showed improvement in bone mineral density and she was advised to continue Fosamax. She had repeat DEXA scan in September 2021 which showed worsening of bone mineral density with T score of -2.  8 at the lumbar spine and -2.5 at the left femoral neck although DEXA scan was obtained at a different facility. She denies any history of fragility fractures. She denies any side effects with Fosamax. She takes vitamin D with calcium. She denies any family history of osteoporosis. She denies any parental history of hip fractures. She does not drink alcohol or smoke.   She has not been on any other medication for osteoporosis in the past    HPI  Review of Systems    REVIEW OF SYSTEMS:   Constitutional: No unanticipated weight loss or fevers. No fatigue and malaise. Integumentary: No rash, photosensitivity, malar rash, livedo reticularis, alopecia and Raynaud's symptoms, sclerodactyly, skin tightening  Eyes: negative for visual disturbance and persistent redness, discharge from eyes   ENT: - No tinnitus, vertigo, or recurrent ear infections.  - No history of nasal/oral ulcers. - No history of dry eyes/dry mouth  Cardiovascular: No history of pericarditis, chest pain or murmur or palpitations  Respiratory: No shortness of breath, cough or history of interstitial lung disease. No history of pleurisy. No history of tuberculosis or atypical infections. Gastrointestinal: No history of heart burn, dysphagia or esophageal dysmotility. No change in bowel habits or any inflammatory bowel disease. Genitourinary: No history of renal disease, miscarriages. Hematologic/Lymphatic: No abnormal bruising or bleeding, blood clots or swollen lymph nodes. Musculoskeletal: Denies having any swollen joints, joint pains or stiffness   Neurological: No history of headaches, seizure or focal weakness. No history of neuropathies, paresthesias or hyperesthesias, facial droop, diplopia  Psychiatric: No history of bipolar disease  Endocrine: Denies any polyuria, polydipsia   Allergic/Immunologic: No nasal congestion or hives. I have reviewed patients Past medical History, Social History and Family History as mentioned in her chart and this remains unchanged fromprevious.     Past Medical History:   Diagnosis Date    Age-related osteoporosis without current pathological fracture 7/10/2017    7.2017 -3 spine -2.2 hip vit D 77 fosamax    Asthma     Bilateral hearing loss 7/1/2021    Bilateral hearing loss 7/1/2021    Calculus of gallbladder without cholecystitis without obstruction 8/8/2019    Constipation     Cough 2/5/2018    FEV1/FVC 79% post 80 % FEV1 2.31 ( 73%) restriction on change with bronchodilators    Depression     Gastroesophageal reflux  Hypertension     Mental retardation, moderate (I.Q. 35-49)     Mentally challenged 8/8/2019    Seizure disorder ([facility] [address]     Shortness of breath 8/16/2017    ? asthma    Slow transit constipation 8/16/2017     Past Surgical History:   Procedure Laterality Date    COLONOSCOPY      HYSTERECTOMY      HYSTERECTOMY, VAGINAL      SALPINGO-OOPHORECTOMY Bilateral     beningn mass     Social History     Socioeconomic History    Marital status:      Spouse name: Not on file    Number of children: Not on file    Years of education: Not on file    Highest education level: Not on file   Occupational History    Not on file   Tobacco Use    Smoking status: Never Smoker    Smokeless tobacco: Never Used   Vaping Use    Vaping Use: Never used   Substance and Sexual Activity    Alcohol use: No    Drug use: No    Sexual activity: Not on file   Other Topics Concern    Not on file   Social History Narrative    Not on file     Social Determinants of Health     Financial Resource Strain:     Difficulty of Paying Living Expenses: Not on file   Food Insecurity:     Worried About Running Out of Food in the Last Year: Not on file    Tennille of Food in the Last Year: Not on file   Transportation Needs:     Lack of Transportation (Medical): Not on file    Lack of Transportation (Non-Medical):  Not on file   Physical Activity:     Days of Exercise per Week: Not on file    Minutes of Exercise per Session: Not on file   Stress:     Feeling of Stress : Not on file   Social Connections:     Frequency of Communication with Friends and Family: Not on file    Frequency of Social Gatherings with Friends and Family: Not on file    Attends Worship Services: Not on file    Active Member of Clubs or Organizations: Not on file    Attends Club or Organization Meetings: Not on file    Marital Status: Not on file   Intimate Partner Violence:     Fear of Current or Ex-Partner: Not on file    Emotionally Abused: Not on file    Physically Abused: Not on file    Sexually Abused: Not on file   Housing Stability:     Unable to Pay for Housing in the Last Year: Not on file    Number of Places Lived in the Last Year: Not on file    Unstable Housing in the Last Year: Not on file     History reviewed. No pertinent family history. PHYSICAL EXAM   Vitals:    12/09/21 1346   BP: 127/75   Pulse: 82   Weight: 180 lb 6.4 oz (81.8 kg)     Physical Exam  Constitutional:  Well developed, well nourished, no acute distress, non-toxic appearance   Musculoskeletal:    Ambulates without assistance, normal gait  Neck: Full ROM, no tenderness,supple   Upper Extremities        Shoulder: Full ROM, no crepitus        Elbow:  Full ROM, no synovitis, no deformity        Wrist: Full ROM, no synovitis, no deformity, no ulnar deviation        Fingers: No sclerodactly, no active raynaud's, no ulcers. MCPs: No synovitis, no deformity             PIPs:  No synovitis, no deformity             DIPs: No synovitis, no deformity             Nails: No pitting, no telangiectasias. Lower Extremities        Hip: Full ROM, no tenderness to palpation        Knee: Full ROM, no erythema/swelling/laxity/crepitus. Patella not ballotable. Ankle: Full ROM, no swelling or erythema        MTPs: No swelling or erythema  Back- no tenderness. Eyes:  PERRL, extra ocular movements intact, conjunctiva normal   HEENT:  Atraumatic, normocephalic, external ears normal, oropharynx moist, no pharyngeal exudates. Respiratory:  No respiratory distress  GI:  Soft, nondistended, normal bowel sounds, nontender, noorganomegaly, no mass, no rebound, no guarding   :  No costovertebral angle tenderness   Integument:  Well hydrated, no rash or telangiectasias  Lymphatic:  No lymphadenopathy noted   Neurologic:   Alert & oriented x 3, CN 2-12 normal, no focal deficits noted. Sensations Intact. Muscle strength 5/5 proximally and distally in upper and lower extremities. Psychiatric:  Speech and behavior appropriate           LABS AND IMAGING  Outside data reviewed and in HPI    Lab Results   Component Value Date    WBC 5.5 03/29/2021    RBC 4.21 03/29/2021    HGB 13.6 03/29/2021    HCT 39.7 03/29/2021     03/29/2021    MCV 94.3 03/29/2021    MCH 32.4 03/29/2021    MCHC 34.4 03/29/2021    RDW 12.9 03/29/2021    SEGSPCT 54.9 01/07/2012    LYMPHOPCT 18.7 02/05/2018    MONOPCT 10.0 02/05/2018    EOSPCT 1.2 05/04/2017    BASOPCT 0.4 02/05/2018    MONOSABS 1.0 02/05/2018    LYMPHSABS 1.9 02/05/2018    EOSABS 0.1 02/05/2018    BASOSABS 0.0 02/05/2018    DIFFTYPE Auto-K 01/07/2012       Chemistry        Component Value Date/Time     03/29/2021 1355    K 4.3 03/29/2021 1355     03/29/2021 1355    CO2 25 03/29/2021 1355    BUN 17 03/29/2021 1355    CREATININE 0.8 03/29/2021 1355        Component Value Date/Time    CALCIUM 9.1 03/29/2021 1355    ALKPHOS 69 03/29/2021 1355    AST 25 03/29/2021 1355    ALT 27 03/29/2021 1355    BILITOT <0.2 03/29/2021 1355          No results found for: SEDRATE  No results found for: CRP  No results found for: CHARITY, ARMAND, SSA, SSB, C3, C4  No results found for: RF, CCPABIGG  No results found for: CHARITY, ANATITER, ANAINT, PATH  No results found for: DSDNAG, DSDNAIGGIFA  No results found for: SSAROAB, SSALAAB  No results found for: SMAB, RNPAB  No results found for: CENTABIGG  No results found for: C3, C4, ACE  Lab Results   Component Value Date    VITD25 50.9 03/29/2021     No results found for: Bonnetta Manners  Lab Results   Component Value Date    CYJGEEEW62 346 03/29/2021     No results found for: TSHFT4, TSH  Lab Results   Component Value Date    VITD25 50.9 03/29/2021       Radiology:    Dexa scan 9/12/2021 [[facility]]  T score -2.8 at the lumbar spine, -2.5 at the left femoral neck, -2.4 at the right femoral neck  DEXA scan 8/31/2018 [facility]]  T score -2.3 at the lumbar spine, -1.9 at the left femoral neck    DEXA scan 8/4/2017 [facility]]  T score -3 at the lumbar spine, -2.2 at the left femoral neck and -2.3 at the right femoral neck  ######################################################################    I thank you for giving me theopportunity to participate in [facility]. If you have any questions or concerns please feel free to contact me. I look forward to following  Marge Aguilar along with you. Electronically signed by: Janaury Tony MD, MD, 12/9/2021 3:00 PM    Documentation was done using voice recognition dragon software. Every effort was made to ensure accuracy;however, inadvertent unintentional computerized transcription errors may be present.

## 2021-12-10 NOTE — TELEPHONE ENCOUNTER
I have sent clinicals to [facility] [place] for a   PA on Marcia Denney

## 2021-12-16 ENCOUNTER — TELEPHONE (OUTPATIENT)
Dept: RHEUMATOLOGY | Age: 71
End: 2021-12-16

## 2021-12-16 NOTE — TELEPHONE ENCOUNTER
Caretaker Miguelina Saleem called in stating that Paris Aviles had an appointment on 12/09 with Dr. Miya Miller at the [facility] office, she states that she had a horrible experience during the visit, not with the Dr but with the staff. She states that during the visit while Dr. Miya Miller was in the room with Paris Aviles it was very loud and \"party like\" right outside the exam room door. States that she had a very hard time hearing Dr. Miya Miller during the visit. Miguelina Saleem would like to know when is Paris Aviles supposed to stop taking the fosamax? She states that she took fosamax last Friday and is due to take it tomorrow at 7:00am. She also would like to know if she was approved for Prolia? States that someone was supposed to call her regarding labs and no one has called her. ( She knows results now, since I gave them to her) but states that someone will call her with directions on what to do next. Also wants to know when she needs to come for her next appointment? Please advise.

## 2021-12-16 NOTE — TELEPHONE ENCOUNTER
Please call the patient and let her know that she will stop Fosamax once we have an approval for Prolia. Prolia was sent for prior authorization. Please check the status and let her know.   She will come back for Prolia injection once it is approved

## 2021-12-23 NOTE — TELEPHONE ENCOUNTER
Per [facility] has determined that the patients preferred pharmacy is a better fit for the patient  based on their plan benefit design. April Prashanth will transfer the prescription to [address] [place] [address]) whose phone number is [phone] and fax number is [phone].

## 2021-12-27 ENCOUNTER — TELEPHONE (OUTPATIENT)
Dept: FAMILY MEDICINE CLINIC | Age: 71
End: 2021-12-27

## 2021-12-27 DIAGNOSIS — J45.20 MILD INTERMITTENT ASTHMA WITHOUT COMPLICATION: ICD-10-CM

## 2021-12-27 DIAGNOSIS — M19.90 ARTHRITIS: ICD-10-CM

## 2021-12-27 RX ORDER — MELOXICAM 7.5 MG/1
TABLET ORAL
Qty: 30 TABLET | Refills: 0 | Status: SHIPPED | OUTPATIENT
Start: 2021-12-27 | End: 2021-12-27 | Stop reason: SDUPTHER

## 2021-12-27 RX ORDER — MELOXICAM 7.5 MG/1
TABLET ORAL
Qty: 30 TABLET | Refills: 0 | Status: SHIPPED | OUTPATIENT
Start: 2021-12-27 | End: 2022-10-04

## 2021-12-27 RX ORDER — ALBUTEROL SULFATE 90 UG/1
2 AEROSOL, METERED RESPIRATORY (INHALATION) EVERY 6 HOURS PRN
COMMUNITY
End: 2022-08-02

## 2021-12-27 RX ORDER — ALBUTEROL SULFATE 90 UG/1
AEROSOL, METERED RESPIRATORY (INHALATION)
Qty: 1 EACH | Refills: 0 | Status: SHIPPED | OUTPATIENT
Start: 2021-12-27 | End: 2022-08-02

## 2021-12-27 NOTE — TELEPHONE ENCOUNTER
Medication:   Requested Prescriptions     Signed Prescriptions Disp Refills    meloxicam (MOBIC) 7.5 MG tablet 30 tablet 0     Sig: TAKE ONE TABLET BY MOUTH ONCE A DAY. FOR PAIN     Authorizing Provider: KEKE Mac    VENTOLIN  (90 Base) MCG/ACT inhaler 1 each 0     Sig: INHALE 2 PUFFS INTO THE LUNGS EVERY 6 HOURS AS NEEDED FOR WHEEZING     Authorizing Provider: Marquita Parikh        Last Filled:      Patient Phone Number: [phone] (home)     Last appt: 8/30/2021   Next appt: 1/4/2022    Last OARRS: No flowsheet data found.

## 2021-12-27 NOTE — TELEPHONE ENCOUNTER
Sophia Urena the care giver for the Pt called in requesting a refill of Meloxicam to be called into [facility].  Reachable at [phone]

## 2021-12-27 NOTE — TELEPHONE ENCOUNTER
Spoke with patient caregiver and sent request for inhaler, urgent need to [facility]. Other request ok for Ziyad Donaldson tomorrow.

## 2022-01-06 ENCOUNTER — PATIENT MESSAGE (OUTPATIENT)
Dept: FAMILY MEDICINE CLINIC | Age: 72
End: 2022-01-06

## 2022-01-06 ENCOUNTER — VIRTUAL VISIT (OUTPATIENT)
Dept: FAMILY MEDICINE CLINIC | Age: 72
End: 2022-01-06
Payer: MEDICARE

## 2022-01-06 DIAGNOSIS — Z86.69 H/O IMPACTED CERUMEN: ICD-10-CM

## 2022-01-06 DIAGNOSIS — K63.5 HYPERPLASTIC COLONIC POLYP, UNSPECIFIED PART OF COLON: ICD-10-CM

## 2022-01-06 DIAGNOSIS — F80.0 IMPAIRED SPEECH ARTICULATION: ICD-10-CM

## 2022-01-06 DIAGNOSIS — K59.01 SLOW TRANSIT CONSTIPATION: ICD-10-CM

## 2022-01-06 DIAGNOSIS — G40.909 NONINTRACTABLE EPILEPSY WITHOUT STATUS EPILEPTICUS, UNSPECIFIED EPILEPSY TYPE (HCC): ICD-10-CM

## 2022-01-06 DIAGNOSIS — M81.0 AGE-RELATED OSTEOPOROSIS WITHOUT CURRENT PATHOLOGICAL FRACTURE: Primary | ICD-10-CM

## 2022-01-06 DIAGNOSIS — F79 MENTALLY CHALLENGED: ICD-10-CM

## 2022-01-06 DIAGNOSIS — H91.93 BILATERAL HEARING LOSS, UNSPECIFIED HEARING LOSS TYPE: ICD-10-CM

## 2022-01-06 PROBLEM — E66.3 OVERWEIGHT WITH BODY MASS INDEX (BMI) 25.0-29.9: Status: ACTIVE | Noted: 2021-07-01

## 2022-01-06 PROCEDURE — 1123F ACP DISCUSS/DSCN MKR DOCD: CPT | Performed by: INTERNAL MEDICINE

## 2022-01-06 PROCEDURE — 3017F COLORECTAL CA SCREEN DOC REV: CPT | Performed by: INTERNAL MEDICINE

## 2022-01-06 PROCEDURE — 1090F PRES/ABSN URINE INCON ASSESS: CPT | Performed by: INTERNAL MEDICINE

## 2022-01-06 PROCEDURE — 99213 OFFICE O/P EST LOW 20 MIN: CPT | Performed by: INTERNAL MEDICINE

## 2022-01-06 PROCEDURE — G8427 DOCREV CUR MEDS BY ELIG CLIN: HCPCS | Performed by: INTERNAL MEDICINE

## 2022-01-06 PROCEDURE — G8399 PT W/DXA RESULTS DOCUMENT: HCPCS | Performed by: INTERNAL MEDICINE

## 2022-01-06 PROCEDURE — 4040F PNEUMOC VAC/ADMIN/RCVD: CPT | Performed by: INTERNAL MEDICINE

## 2022-01-06 SDOH — ECONOMIC STABILITY: FOOD INSECURITY: WITHIN THE PAST 12 MONTHS, YOU WORRIED THAT YOUR FOOD WOULD RUN OUT BEFORE YOU GOT MONEY TO BUY MORE.: NEVER TRUE

## 2022-01-06 SDOH — ECONOMIC STABILITY: FOOD INSECURITY: WITHIN THE PAST 12 MONTHS, THE FOOD YOU BOUGHT JUST DIDN'T LAST AND YOU DIDN'T HAVE MONEY TO GET MORE.: NEVER TRUE

## 2022-01-06 ASSESSMENT — SOCIAL DETERMINANTS OF HEALTH (SDOH): HOW HARD IS IT FOR YOU TO PAY FOR THE VERY BASICS LIKE FOOD, HOUSING, MEDICAL CARE, AND HEATING?: NOT HARD AT ALL

## 2022-01-06 NOTE — PROGRESS NOTES
The below patient isbeing evaluated by a Virtual Visit (video visit) encounter to address concerns as mentioned above. A caregiver was present when appropriate. Due to this being a TeleHealth encounter (During LAKSY-46 public health emergency), evaluation of the following organ systems was limited: Vitals/Constitutional/EENT/Resp/CV/GI//MS/Neuro/Skin/Heme-Lymph-Imm. Pursuant to the emergency declaration under the [facility], [address] [place] [address] authority and the [employer] Act, this Virtual Visit was conducted with patient's (and/or legal guardian's) consent, to reduce the patient's risk of exposure to COVID-19 and provide necessary medical care. The patient (and/or legal guardian) has also been advised to contact this office for worsening conditions or problems, and seek emergency medical treatment and/or call 911 if deemed necessary. Patient identification was verified at the start of the visit: Yes    Total time spent on this encounter: Not billed by time    Services were provided through a video synchronous discussion virtually to substitute for in-person clinic visit. Patient and provider were located at their individual homes. --Ke Ramesh MD on 1/6/2022 at 11:58 AM    An electronic signature was used to authenticate this note. HPI: Jeannine Golden presents for follow-up. chronic health issues include asymptomatic cholelithiasis, mental challenged, hearing loss, speech abnormality, seizure disorder, osteoporosis, hyperplastic polyp, constipation, high risk medication use.     Improvement with ongoing speech therapy on thursdays.  Wears hearing aids.  Debrox 5 drops 3 days out of the month. Doing better. ent 12.2021 for cerumein removal      Mentally challenged and living with her caretaker.  Neurology note from 2007 states possible birth hypoxia she is unable to read or write and did not attend school.  History of partial complex seizures.  Currently on Keppra.  Neurology appointment August 2020 with follow-up in a year normal liver function 12/2021  No recurrent seizures. Therapeutic level.     Remote diagnosis of asthma. Using no medication currently. Normal pulmonary function tests in the past. Symbicort and albuterol discontinued without difficulty. Albuterol at home if needed. Has not used in several months      constipation. Does well with MiraLAX. Asymptomatic gallstone. No abdominal complaints      Mammogram BI-RADS 2 September 2021    Osteoporosis. DEXA scan September 2021 T score -2.8 spine and -2.5 femoral neck. Using Fosamax until Prolia is approved. Asymptomatic gallstones, hyperplastic polyp 9/2016      Past hospitalization:   Salpingo-oophorectomy bilaterally for benign mass.      Social history: No tobacco. Rare alcohol. . No domestic violence . [employer] retired .Lives with caretaker.    Not sexually active. enjoys puzzles. Has a cat. Walking daily 30 min.       Family history hypertension, unknown breast or colon cancer      Review of systems: Hearing loss. Mentally challenge. No falls. Feels safe. Hearing deficit. Hearing aids. Speech impediment. Chronic constipation. No current wheeze or shortness of breath denies chest pain or palpitations. No breast masses or discharge. BI-RADS 1 mammogram 2018. Positive constipation. No dysuria. Amenorrheic. Hysterectomy oophorectomy bilaterally for benign mass. Recurrent cerumen impaction. Happy. BMI 30.   No complaints of knee pain GE reflux or apnea    Constitutional, ent, CV, respiratory, GI, , joint, skin, allergic and psychiatric ROS reviewed and negative except for above    No Known Allergies    Outpatient Medications Marked as Taking for the 1/6/22 encounter (Virtual Visit) with Tere Patel MD   Medication Sig Dispense Refill    omeprazole (PRILOSEC) 20 MG delayed release capsule TAKE 1 CAPSULE BY MOUTH ONCE DAILY FOR GERD. 31 capsule 5    fexofenadine (ALLERGY RELIEF) 180 MG tablet TAKE ONE TABLET BY MOUTH ONCE DAILY FOR ALLERGIES. 31 tablet 5    alendronate (FOSAMAX) 70 MG tablet TAKE ONE TABLET BY MOUTH ONCE A WEEK ON FRIDAY. TAKE ON AN EMPTY STOMACH. FOR OSTEOPORSIS. 4 tablet 5    albuterol sulfate HFA (VENTOLIN HFA) 108 (90 Base) MCG/ACT inhaler Inhale 2 puffs into the lungs every 6 hours as needed for Wheezing      albuterol sulfate HFA (VENTOLIN HFA) 108 (90 Base) MCG/ACT inhaler INHALE 2 PUFFS INTO THE LUNGS EVERY 6 HOURS AS NEEDED FOR WHEEZING 1 each 0    meloxicam (MOBIC) 7.5 MG tablet TAKE ONE TABLET BY MOUTH up to ONCE A DAY. FOR PAIN 30 tablet 0    escitalopram (LEXAPRO) 20 MG tablet TAKE ONE TABLET BY MOUTH ONCE DAILY FOR MOOD 31 tablet 5    vitamin D (CHOLECALCIFEROL) 50 MCG (2000 UT) TABS tablet TAKE ONE TABLET BY MOUTH ONCE A DAY. SUPPLEMENT 31 tablet 0    vitamin B-12 (CYANOCOBALAMIN) 1000 MCG tablet TAKE ONE TABLET ONCE DAILY: VITAMIN 30 tablet 5    Multiple Vitamin (THEREMS) TABS TAKE ONE TABLET BY MOUTH ONCE DAILY -VITAMIN SUPPLEMENT 31 tablet 5    polyethylene glycol (MIRALAX) 17 g PACK packet 17 grams once daily for constipation 100 each 0    carbamide peroxide (DEBROX) 6.5 % otic solution Place 2 drops in each ear every two weeks (Patient taking differently: 5 drops Indications: per caregiver getting through ENT now, 5 drops each for 3 consec days x 1 month ) 1 Bottle 0    levETIRAcetam (KEPPRA) 500 MG tablet TAKE ONE TABLET BY MOUTH TWICE DAILY FOR SEIZURES 60 tablet 0    acetaminophen (TYLENOL) 325 MG tablet Take 325 mg by mouth Take 2 tablets every by mouth every 4 hours as needed for minor pain or fever over 101 degrees.  SUNSCREEN SPF50 EX Apply topically Apply as needed to protect skin from from sunburn.  Multiple Vitamins-Minerals (THERAPEUTIC MULTIVITAMIN-MINERALS) tablet Take 1 tablet by mouth daily.                Past Medical History:   Diagnosis Date    Age-related osteoporosis without current pathological fracture 7/10/2017    7.2017 -3 spine -2.2 hip vit D 77 fosamax    Asthma     Bilateral hearing loss 7/1/2021    Bilateral hearing loss 7/1/2021    Calculus of gallbladder without cholecystitis without obstruction 8/8/2019    Constipation     Cough 2/5/2018    FEV1/FVC 79% post 80 % FEV1 2.31 ( 73%) restriction on change with bronchodilators    Depression     Gastroesophageal reflux     Hypertension     Mental retardation, moderate (I.Q. 35-49)     Mentally challenged 8/8/2019    Seizure disorder ([facility] [address]     Shortness of breath 8/16/2017    ? asthma    Slow transit constipation 8/16/2017       Past Surgical History:   Procedure Laterality Date    COLONOSCOPY      HYSTERECTOMY      HYSTERECTOMY, VAGINAL      SALPINGO-OOPHORECTOMY Bilateral     beningn mass             No family history on file. Review of Systems      Chemistry        Component Value Date/Time     12/13/2021 0851    K 4.2 12/13/2021 0851     12/13/2021 0851    CO2 23 12/13/2021 0851    BUN 16 12/13/2021 0851    CREATININE 0.8 12/13/2021 0851        Component Value Date/Time    CALCIUM 9.9 12/13/2021 0851    ALKPHOS 81 12/13/2021 0851    AST 31 12/13/2021 0851    ALT 40 12/13/2021 0851    BILITOT 0.4 12/13/2021 0851            NO vitals  as this was a virtual visit during cvod19 pandemic  Dysarthria, animated, well-groomed and interactive.     Lab Results   Component Value Date    WBC 5.0 12/13/2021    HGB 14.0 12/13/2021    HCT 42.2 12/13/2021    MCV 96.3 12/13/2021     12/13/2021     Lab Results   Component Value Date    LABA1C 5.1 03/01/2019     Lab Results   Component Value Date    EAG 99.7 03/01/2019     Lab Results   Component Value Date    LABA1C 5.1 03/01/2019     No components found for: CHLPL  No results found for: TRIG  Lab Results   Component Value Date    HDL 55 05/04/2017     Lab Results   Component Value Date    LDLCALC 77 05/04/2017     No results found for: LABVLDL    Old labs and records reviewed or requested  Discussed past lab and studies with patient      Diagnosis Orders   1. Age-related osteoporosis without current pathological fracture     2. Bilateral hearing loss, unspecified hearing loss type     3. Slow transit constipation     4. Mentally challenged     5. Hyperplastic colonic polyp, unspecified part of colon     6. Nonintractable epilepsy without status epilepticus, unspecified epilepsy type ([address]     7. H/O impacted cerumen     8. Impaired speech articulation       Osteoporosis. Difficulty at specialty visit. Asking if I can do this for her. Hearing loss cerumen impaction following with ENT. Prophylactic Cerumenex 3 days out of the month. Constipation doing well with her MiraLAX. History of hyperplastic polyp. Epilepsy good control. Mood disorder seems to be doing well on her Lexapro 20. Speech impediment continue on with speech therapy. Apparently having some elevated weight. Continue exercise discussed weight loss    No follow-ups on file. Diagnosis and treatment discussed.   Possible side effects of medication reviewed  Patients questions answered  Follow up understood  Pt aware if they are not contacted about any test results , this does not mean they are normal.  They should call

## 2022-01-06 NOTE — TELEPHONE ENCOUNTER
From: Jeannine Golden  To: Dr. Damon Londono: 1/6/2022 12:31 PM EST  Subject: Follow up Appointment    Hi Dr John Mendoza,    I forgot to ask- when would you like to see Natalia Washington again? Thanks!   Adriana Pretty

## 2022-01-07 DIAGNOSIS — Z79.899 HIGH RISK MEDICATION USE: ICD-10-CM

## 2022-01-07 DIAGNOSIS — M81.0 OSTEOPOROSIS WITHOUT CURRENT PATHOLOGICAL FRACTURE, UNSPECIFIED OSTEOPOROSIS TYPE: Primary | ICD-10-CM

## 2022-01-10 ENCOUNTER — TELEPHONE (OUTPATIENT)
Dept: FAMILY MEDICINE CLINIC | Age: 72
End: 2022-01-10

## 2022-01-12 DIAGNOSIS — R53.83 OTHER FATIGUE: ICD-10-CM

## 2022-01-12 DIAGNOSIS — M81.0 AGE-RELATED OSTEOPOROSIS WITHOUT CURRENT PATHOLOGICAL FRACTURE: ICD-10-CM

## 2022-01-12 NOTE — TELEPHONE ENCOUNTER
Calling for refill request on prolia.  Please send refill request into [facility] [phone] opt 3 -- goes directly to pharm

## 2022-01-12 NOTE — TELEPHONE ENCOUNTER
Kathy Watts 20 minutes ago (11:23 AM)     MH       Calling for refill request on prolia.  Please send refill request into [facility] [phone] opt 3 -- goes directly to pharm

## 2022-01-17 ENCOUNTER — TELEPHONE (OUTPATIENT)
Dept: FAMILY MEDICINE CLINIC | Age: 72
End: 2022-01-17

## 2022-01-17 NOTE — TELEPHONE ENCOUNTER
[facility] called to verify the prolia script and the address. States that it will be delivered on Thursday before end of business.

## 2022-01-20 ENCOUNTER — TELEPHONE (OUTPATIENT)
Dept: FAMILY MEDICINE CLINIC | Age: 72
End: 2022-01-20

## 2022-01-21 DIAGNOSIS — M81.0 SENILE OSTEOPOROSIS: ICD-10-CM

## 2022-01-26 ENCOUNTER — TELEPHONE (OUTPATIENT)
Dept: FAMILY MEDICINE CLINIC | Age: 72
End: 2022-01-26

## 2022-01-26 NOTE — TELEPHONE ENCOUNTER
Again spoke with infusion center as misunderstanding that we have the prolia injection in the office due to Dr. Viktor Wellington ordering. Orders should always go to infusion center never in the office as the authorization process can be complex. Depending on medicare, dx, etc. Audrey Machado checked into patients last dexa confirmed osteoperosis, last calcium within normal range, and patient has medicare AND medicaid. She gave the ok to give prolia in the office as she cannot take from us once sent here. I will contact patient and schedule her.

## 2022-01-26 NOTE — TELEPHONE ENCOUNTER
Spoke with caretaker and got patient scheduled for next Friday to receive injection. She asks when is patient to stop the fosamax. She would like answer sent through my chart as she needs documentation of this. She also thanks  Dr. Yessenia Larsen very much.

## 2022-01-26 NOTE — TELEPHONE ENCOUNTER
Spoke with [facility] prolia injection , for some reason there was an issue with request. She is pulling the order and restarting the process.

## 2022-02-02 LAB
ANION GAP SERPL CALCULATED.3IONS-SCNC: 14 MMOL/L (ref 3–16)
BUN BLDV-MCNC: 15 MG/DL (ref 7–20)
CALCIUM SERPL-MCNC: 9.4 MG/DL (ref 8.3–10.6)
CHLORIDE BLD-SCNC: 101 MMOL/L (ref 99–110)
CO2: 23 MMOL/L (ref 21–32)
CREAT SERPL-MCNC: 0.8 MG/DL (ref 0.6–1.2)
GFR AFRICAN AMERICAN: >60
GFR NON-AFRICAN AMERICAN: >60
GLUCOSE BLD-MCNC: 92 MG/DL (ref 70–99)
POTASSIUM SERPL-SCNC: 4.5 MMOL/L (ref 3.5–5.1)
SODIUM BLD-SCNC: 138 MMOL/L (ref 136–145)
VITAMIN D 25-HYDROXY: 62.7 NG/ML

## 2022-02-08 ENCOUNTER — NURSE ONLY (OUTPATIENT)
Dept: FAMILY MEDICINE CLINIC | Age: 72
End: 2022-02-08

## 2022-02-08 NOTE — PROGRESS NOTES
Patient seen today for prolia injection. Administration forms completed and sent to caregiver via [employer].

## 2022-05-16 DIAGNOSIS — F39 MOOD DISORDER (HCC): ICD-10-CM

## 2022-05-16 RX ORDER — ESCITALOPRAM OXALATE 20 MG/1
TABLET ORAL
Qty: 30 TABLET | Refills: 11 | Status: SHIPPED | OUTPATIENT
Start: 2022-05-16

## 2022-06-20 ENCOUNTER — PATIENT MESSAGE (OUTPATIENT)
Dept: FAMILY MEDICINE CLINIC | Age: 72
End: 2022-06-20

## 2022-06-20 DIAGNOSIS — R53.83 OTHER FATIGUE: ICD-10-CM

## 2022-06-20 DIAGNOSIS — M81.0 AGE-RELATED OSTEOPOROSIS WITHOUT CURRENT PATHOLOGICAL FRACTURE: ICD-10-CM

## 2022-06-23 DIAGNOSIS — Z79.899 HIGH RISK MEDICATION USE: Primary | ICD-10-CM

## 2022-06-24 RX ORDER — DIPHENHYDRAMINE HYDROCHLORIDE 50 MG/ML
50 INJECTION INTRAMUSCULAR; INTRAVENOUS
Status: CANCELLED | OUTPATIENT
Start: 2022-06-24

## 2022-06-24 RX ORDER — ONDANSETRON 2 MG/ML
8 INJECTION INTRAMUSCULAR; INTRAVENOUS
Status: CANCELLED | OUTPATIENT
Start: 2022-06-24

## 2022-06-24 RX ORDER — SODIUM CHLORIDE 9 MG/ML
INJECTION, SOLUTION INTRAVENOUS CONTINUOUS
Status: CANCELLED | OUTPATIENT
Start: 2022-06-24

## 2022-06-24 RX ORDER — ACETAMINOPHEN 325 MG/1
650 TABLET ORAL
Status: CANCELLED | OUTPATIENT
Start: 2022-06-24

## 2022-06-24 RX ORDER — ALBUTEROL SULFATE 90 UG/1
4 AEROSOL, METERED RESPIRATORY (INHALATION) PRN
Status: CANCELLED | OUTPATIENT
Start: 2022-06-24

## 2022-06-24 RX ORDER — EPINEPHRINE 1 MG/ML
0.3 INJECTION, SOLUTION, CONCENTRATE INTRAVENOUS PRN
Status: CANCELLED | OUTPATIENT
Start: 2022-06-24

## 2022-08-02 ENCOUNTER — TELEMEDICINE (OUTPATIENT)
Dept: FAMILY MEDICINE CLINIC | Age: 72
End: 2022-08-02
Payer: MEDICARE

## 2022-08-02 DIAGNOSIS — K59.01 SLOW TRANSIT CONSTIPATION: ICD-10-CM

## 2022-08-02 DIAGNOSIS — K63.5 HYPERPLASTIC COLONIC POLYP, UNSPECIFIED PART OF COLON: ICD-10-CM

## 2022-08-02 DIAGNOSIS — F79 MENTALLY CHALLENGED: ICD-10-CM

## 2022-08-02 DIAGNOSIS — M81.0 AGE-RELATED OSTEOPOROSIS WITHOUT CURRENT PATHOLOGICAL FRACTURE: Primary | ICD-10-CM

## 2022-08-02 DIAGNOSIS — M81.0 SENILE OSTEOPOROSIS: ICD-10-CM

## 2022-08-02 DIAGNOSIS — G40.909 NONINTRACTABLE EPILEPSY WITHOUT STATUS EPILEPTICUS, UNSPECIFIED EPILEPSY TYPE (HCC): ICD-10-CM

## 2022-08-02 DIAGNOSIS — H91.93 BILATERAL HEARING LOSS, UNSPECIFIED HEARING LOSS TYPE: ICD-10-CM

## 2022-08-02 DIAGNOSIS — Z86.69 H/O IMPACTED CERUMEN: ICD-10-CM

## 2022-08-02 DIAGNOSIS — K80.20 CALCULUS OF GALLBLADDER WITHOUT CHOLECYSTITIS WITHOUT OBSTRUCTION: ICD-10-CM

## 2022-08-02 DIAGNOSIS — E78.2 MIXED HYPERLIPIDEMIA: ICD-10-CM

## 2022-08-02 DIAGNOSIS — E53.8 VITAMIN B 12 DEFICIENCY: ICD-10-CM

## 2022-08-02 PROCEDURE — 99214 OFFICE O/P EST MOD 30 MIN: CPT | Performed by: INTERNAL MEDICINE

## 2022-08-02 PROCEDURE — 1090F PRES/ABSN URINE INCON ASSESS: CPT | Performed by: INTERNAL MEDICINE

## 2022-08-02 PROCEDURE — G8399 PT W/DXA RESULTS DOCUMENT: HCPCS | Performed by: INTERNAL MEDICINE

## 2022-08-02 PROCEDURE — 1123F ACP DISCUSS/DSCN MKR DOCD: CPT | Performed by: INTERNAL MEDICINE

## 2022-08-02 PROCEDURE — 3017F COLORECTAL CA SCREEN DOC REV: CPT | Performed by: INTERNAL MEDICINE

## 2022-08-02 PROCEDURE — G8427 DOCREV CUR MEDS BY ELIG CLIN: HCPCS | Performed by: INTERNAL MEDICINE

## 2022-08-02 ASSESSMENT — PATIENT HEALTH QUESTIONNAIRE - PHQ9
SUM OF ALL RESPONSES TO PHQ QUESTIONS 1-9: 0
2. FEELING DOWN, DEPRESSED OR HOPELESS: 0
SUM OF ALL RESPONSES TO PHQ9 QUESTIONS 1 & 2: 0
SUM OF ALL RESPONSES TO PHQ QUESTIONS 1-9: 0
1. LITTLE INTEREST OR PLEASURE IN DOING THINGS: 0

## 2022-08-02 NOTE — PROGRESS NOTES
Jacqueline Perry, was evaluated through a synchronous (real-time) audio-video encounter. The patient (or guardian if applicable) is aware that this is a billable service, which includes applicable co-pays. This Virtual Visit was conducted with patient's (and/or legal guardian's) consent. The visit was conducted pursuant to the emergency declaration under the [address] [place] [address] and the [employer] Act. Patient identification was verified, and a caregiver was present when appropriate. The patient was located in a state where the provider was licensed to provide care. Patient identification was verified at the start of the visit: Yes    Total time spent on this encounter: Not billed by time    Services were provided through a video synchronous discussion virtually to substitute for in-person clinic visit. Patient and provider were located at their individual homes. --Jamie Dean MD on 8/2/2022 at 7:55 AM    An electronic signature was used to authenticate this note. HPI: Jacqueline Perry presents for follow-up    chronic health issues include asymptomatic cholelithiasis, mental challenged, hearing loss, speech abnormality, seizure disorder, osteoporosis, hyperplastic polyp, history constipation high risk medication use. Improvement with ongoing speech therapy + hearing aids. Debrox 5 drops 3 days out of the month by audiology. Doing well. Upcoming follow-up     Mentally challenged and living with her caretaker. Neurology note from 2007 states possible birth hypoxia she is unable to read or write and did not attend school. History of partial complex seizures. Currently on Keppra. Does yearly with neurology. No recurrent seizures. Keppra with normal liver functions last February 2022    Lexapro 20 mg daily. Doing well. Not interested in changing dose currently.   Had mental health issues previously    Diagnosis of asthma remotely normal pulmonary function test.  Has not used albuterol over the last 6 weeks. We will discontinue albuterol and Ventolin. If recurrent bronchitis or any symptoms associated with reactive airways disease can certainly restart this. She is up-to-date with pneumonia vaccine has had 1 booster of COVID-vaccine. As needed Allegra    History constipation, hyperplastic polyp. MiraLAX successful. Repeat colonoscopy June 2022. Dr. Maria Del Carmen Reid may need to find this report. Asymptomatic gallstones. Aware of symptoms associated with gallbladder attacks      Mammogram BI-RADS 2 September 2021 repeat due September 2023     Osteoporosis. DEXA scan September 2021 T score -2.8 spine and -2.5 femoral neck. Using Fosamax with out difficulty until Prolia was improved. Will be having injectable later this month. Last vitamin D level appropriate. Active. Weight is down. More active. No concern with abnormal weight loss. Past hospitalization:   Salpingo-oophorectomy bilaterally for benign mass. Social history: No tobacco. Rare alcohol. . No domestic violence . [employer] retired . Lives with caretaker over 6 years. .    Not sexually active. enjoys puzzles. Has a cat. 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 Mentally challenge. No falls. Feels safe. Hearing deficit. Hearing aids. Speech impediment. Chronic constipation. No current wheeze or shortness of breath denies chest pain or palpitations. No breast masses or discharge. BI-RADS 1 September 2021. Positive constipation. No dysuria. Amenorrheic. Hysterectomy oophorectomy bilaterally for benign mass. Recurrent cerumen impaction. Happy. BMI 30.   No complaints of knee pain GE reflux or apnea    Constitutional, ent, CV, respiratory, GI, , joint, skin, allergic and psychiatric ROS reviewed and negative except for above    No Known Allergies    Outpatient Medications Marked as Taking for the 8/2/22 encounter (Telemedicine) with Charis Curling, MD   Medication Sig Dispense Refill    escitalopram (LEXAPRO) 20 MG tablet TAKE ONE TABLET BY MOUTH ONCE DAILY FOR MOOD 30 tablet 11    omeprazole (PRILOSEC) 20 MG delayed release capsule TAKE 1 CAPSULE BY MOUTH ONCE DAILY FOR GERD. 31 capsule 5    fexofenadine (ALLERGY RELIEF) 180 MG tablet TAKE ONE TABLET BY MOUTH ONCE DAILY FOR ALLERGIES. 31 tablet 5    meloxicam (MOBIC) 7.5 MG tablet TAKE ONE TABLET BY MOUTH up to ONCE A DAY. FOR PAIN 30 tablet 0    vitamin D (CHOLECALCIFEROL) 50 MCG (2000 UT) TABS tablet TAKE ONE TABLET BY MOUTH ONCE A DAY. SUPPLEMENT 31 tablet 0    vitamin B-12 (CYANOCOBALAMIN) 1000 MCG tablet TAKE ONE TABLET ONCE DAILY: VITAMIN 30 tablet 5    Multiple Vitamin (THEREMS) TABS TAKE ONE TABLET BY MOUTH ONCE DAILY -VITAMIN SUPPLEMENT 31 tablet 5    polyethylene glycol (MIRALAX) 17 g PACK packet 17 grams once daily for constipation 100 each 0    carbamide peroxide (DEBROX) 6.5 % otic solution Place 2 drops in each ear every two weeks (Patient taking differently: 5 drops Indications: per caregiver getting through ENT now, 5 drops each for 3 consec days x 1 month) 1 Bottle 0    levETIRAcetam (KEPPRA) 500 MG tablet TAKE ONE TABLET BY MOUTH TWICE DAILY FOR SEIZURES 60 tablet 0    pseudoephedrine (SUDAFED) 30 MG tablet Take 30 mg by mouth every 4 hours as needed for Congestion 1-2 Every 6 hours as needed for cold symptoms (Sneezing, running nose, nasal congestion)      acetaminophen (TYLENOL) 325 MG tablet Take 325 mg by mouth Take 2 tablets every by mouth every 4 hours as needed for minor pain or fever over 101 degrees. SUNSCREEN SPF50 EX Apply topically Apply as needed to protect skin from from sunburn. Multiple Vitamins-Minerals (THERAPEUTIC MULTIVITAMIN-MINERALS) tablet Take 1 tablet by mouth daily.                Past Medical History:   Diagnosis Date Age-related osteoporosis without current pathological fracture 7/10/2017    7.2017 -3 spine -2.2 hip vit D 77 fosamax    Asthma     Bilateral hearing loss 7/1/2021    Bilateral hearing loss 7/1/2021    Calculus of gallbladder without cholecystitis without obstruction 8/8/2019    Constipation     Cough 2/5/2018    FEV1/FVC 79% post 80 % FEV1 2.31 ( 73%) restriction on change with bronchodilators    Depression     Gastroesophageal reflux     H/O impacted cerumen 1/6/2022    Recurrent     Hypertension     Mental retardation, moderate (I.Q. 35-49)     Mentally challenged 8/8/2019    Seizure disorder ([facility] [address]     Senile osteoporosis 1/21/2022    Shortness of breath 8/16/2017    ? asthma    Slow transit constipation 8/16/2017       Past Surgical History:   Procedure Laterality Date    COLONOSCOPY      HYSTERECTOMY      HYSTERECTOMY, VAGINAL      SALPINGOOPHORECTOMY Bilateral     beningn mass             No family history on file. Review of Systems      Chemistry        Component Value Date/Time     02/02/2022 1032    K 4.5 02/02/2022 1032     02/02/2022 1032    CO2 23 02/02/2022 1032    BUN 15 02/02/2022 1032    CREATININE 0.8 02/02/2022 1032        Component Value Date/Time    CALCIUM 9.4 02/02/2022 1032    ALKPHOS 81 12/13/2021 0851    AST 31 12/13/2021 0851    ALT 40 12/13/2021 0851    BILITOT 0.4 12/13/2021 0851            NO vitals  as this was a virtual visit during cvod19 pandemic  She is happy. Well-groomed. Dynamic.     Lab Results   Component Value Date    WBC 5.0 12/13/2021    HGB 14.0 12/13/2021    HCT 42.2 12/13/2021    MCV 96.3 12/13/2021     12/13/2021     Lab Results   Component Value Date    LABA1C 5.1 03/01/2019     Lab Results   Component Value Date    EAG 99.7 03/01/2019     Lab Results   Component Value Date    LABA1C 5.1 03/01/2019     No components found for: CHLPL  No results found for: TRIG  Lab Results   Component Value Date    HDL 55 05/04/2017     Lab Results   Component

## 2022-08-03 ENCOUNTER — TELEPHONE (OUTPATIENT)
Dept: FAMILY MEDICINE CLINIC | Age: 72
End: 2022-08-03
Payer: MEDICARE

## 2022-08-03 DIAGNOSIS — Z79.899 HIGH RISK MEDICATION USE: Primary | ICD-10-CM

## 2022-08-03 LAB
A/G RATIO: 2.2 (ref 1.1–2.2)
ALBUMIN SERPL-MCNC: 4.8 G/DL (ref 3.4–5)
ALP BLD-CCNC: 75 U/L (ref 40–129)
ALT SERPL-CCNC: 27 U/L (ref 10–40)
ANION GAP SERPL CALCULATED.3IONS-SCNC: 14 MMOL/L (ref 3–16)
AST SERPL-CCNC: 28 U/L (ref 15–37)
BILIRUB SERPL-MCNC: 0.5 MG/DL (ref 0–1)
BUN BLDV-MCNC: 14 MG/DL (ref 7–20)
CALCIUM SERPL-MCNC: 9.5 MG/DL (ref 8.3–10.6)
CHLORIDE BLD-SCNC: 104 MMOL/L (ref 99–110)
CHOLESTEROL, TOTAL: 192 MG/DL (ref 0–199)
CO2: 24 MMOL/L (ref 21–32)
CREAT SERPL-MCNC: 0.7 MG/DL (ref 0.6–1.2)
GFR AFRICAN AMERICAN: >60
GFR NON-AFRICAN AMERICAN: >60
GLUCOSE BLD-MCNC: 108 MG/DL (ref 70–99)
HDLC SERPL-MCNC: 63 MG/DL (ref 40–60)
LDL CHOLESTEROL CALCULATED: 108 MG/DL
POTASSIUM SERPL-SCNC: 4.6 MMOL/L (ref 3.5–5.1)
SODIUM BLD-SCNC: 142 MMOL/L (ref 136–145)
TOTAL PROTEIN: 7 G/DL (ref 6.4–8.2)
TRIGL SERPL-MCNC: 107 MG/DL (ref 0–150)
VLDLC SERPL CALC-MCNC: 21 MG/DL

## 2022-08-03 NOTE — TELEPHONE ENCOUNTER
Boo Rojas      Pt is having a prolia shot next week. She is needing a CMP before then. She just left the lab getting the lipid done. They advised that if someone calls they can add it today. Please advise.

## 2022-08-09 ENCOUNTER — HOSPITAL ENCOUNTER (OUTPATIENT)
Dept: INFUSION THERAPY | Age: 72
Setting detail: INFUSION SERIES
Discharge: HOME | End: 2022-08-09
Payer: MEDICARE

## 2022-08-09 VITALS
HEART RATE: 74 BPM | DIASTOLIC BLOOD PRESSURE: 71 MMHG | SYSTOLIC BLOOD PRESSURE: 188 MMHG | TEMPERATURE: 98.4 F | RESPIRATION RATE: 20 BRPM

## 2022-08-09 DIAGNOSIS — Z79.899 HIGH RISK MEDICATION USE: ICD-10-CM

## 2022-08-09 DIAGNOSIS — M81.0 SENILE OSTEOPOROSIS: Primary | ICD-10-CM

## 2022-08-09 DIAGNOSIS — J34.89 STUFFY AND RUNNY NOSE: ICD-10-CM

## 2022-08-09 DIAGNOSIS — M81.0 AGE RELATED OSTEOPOROSIS, UNSPECIFIED PATHOLOGICAL FRACTURE PRESENCE: ICD-10-CM

## 2022-08-09 PROCEDURE — 6360000002 HC RX W HCPCS: Performed by: INTERNAL MEDICINE

## 2022-08-09 PROCEDURE — 96372 THER/PROPH/DIAG INJ SC/IM: CPT

## 2022-08-09 RX ORDER — ACETAMINOPHEN 325 MG/1
650 TABLET ORAL
Status: CANCELLED | OUTPATIENT
Start: 2023-02-07

## 2022-08-09 RX ORDER — DIPHENHYDRAMINE HYDROCHLORIDE 50 MG/ML
50 INJECTION INTRAMUSCULAR; INTRAVENOUS
Status: CANCELLED | OUTPATIENT
Start: 2023-02-07

## 2022-08-09 RX ORDER — FEXOFENADINE HCL 180 MG/1
TABLET ORAL
Qty: 90 TABLET | Refills: 0 | Status: SHIPPED | OUTPATIENT
Start: 2022-08-09

## 2022-08-09 RX ORDER — OMEPRAZOLE 20 MG/1
CAPSULE, DELAYED RELEASE ORAL
Qty: 90 CAPSULE | Refills: 0 | Status: SHIPPED | OUTPATIENT
Start: 2022-08-09 | End: 2022-11-02 | Stop reason: SDUPTHER

## 2022-08-09 RX ORDER — ALBUTEROL SULFATE 90 UG/1
4 AEROSOL, METERED RESPIRATORY (INHALATION) PRN
Status: CANCELLED | OUTPATIENT
Start: 2023-02-07

## 2022-08-09 RX ORDER — ONDANSETRON 2 MG/ML
8 INJECTION INTRAMUSCULAR; INTRAVENOUS
Status: CANCELLED | OUTPATIENT
Start: 2023-02-07

## 2022-08-09 RX ORDER — SODIUM CHLORIDE 9 MG/ML
INJECTION, SOLUTION INTRAVENOUS CONTINUOUS
Status: CANCELLED | OUTPATIENT
Start: 2023-02-07

## 2022-08-09 RX ORDER — CHOLECALCIFEROL (VITAMIN D3) 125 MCG
CAPSULE ORAL
Qty: 90 TABLET | Refills: 0 | Status: SHIPPED | OUTPATIENT
Start: 2022-08-09 | End: 2022-11-02 | Stop reason: SDUPTHER

## 2022-08-09 RX ORDER — EPINEPHRINE 1 MG/ML
0.3 INJECTION, SOLUTION, CONCENTRATE INTRAVENOUS PRN
Status: CANCELLED | OUTPATIENT
Start: 2023-02-07

## 2022-08-09 RX ADMIN — DENOSUMAB 60 MG: 60 INJECTION SUBCUTANEOUS at 09:10

## 2022-08-09 NOTE — DISCHARGE INSTRUCTIONS
Outpatient Infusion Discharge Instructions  [place] [address] [facility] [address], [address]  Telephone: [phone]    NAME:  Pollo Tejada OF BIRTH:  1950  MEDICAL RECORD NUMBER:  [id number]  DATE:  @ED@    Reason for Outpatient Infusion Visit: ***    If you develop any these symptoms please contact you Doctor    [] Nausea and/or vomiting not relieved with medication   [] Swelling, redness, and/or bleeding at injection or IV site    [] Fever or chills  [] Rash or itching   [] Shortness of breath  [] Please review After Visit Summary (AVS) information on    [] Other      Outpatient [address]: Should you experience any significant changes in your health or have questions about your care please contact the [address] at [address] 8:00 am - 4:00 pm.  If you need help outside these hours and cannot wait until we are again available, contact your Primary Care Physician or go to the hospital emergency room.        Electronically signed by Rosy Bernabe RN on 8/9/2022 at 9:14 AM

## 2022-08-09 NOTE — PROGRESS NOTES
[address] [facility]     Prolia Visit    NAME:  Jacqueline Perry  YOB: 1950  MEDICAL RECORD NUMBER:  [id number]  Episode Date:  8/9/2022    Patient arrived to Outpatient [facility]   [] per wheelchair   [x] ambulatory     Is this the patient's first Prolia Injection? no    Date of last Prolia Injection ? February 8, 2022. Did the patient experience any adverse reactions to Prolia Injection? No    Any recent oral or dental surgery? No    Any recent active fever, infections and/or illnesses? No    Patient has history of pathological fracture? No    Patient has had a recent fracture due to trauma or injury? No    Patient has had a recent orthopedic surgery or procedure done? No    Approximate date of last Dexa scan? 9/22/21       BP (!) 188/71   Pulse 74   Temp 98.4 °F (36.9 °C) (Oral)   Resp 20     Current Lab Data:    Calcium:    Lab Results   Component Value Date/Time    CALCIUM 9.5 08/03/2022 08:03 AM       Patient Currently taking Calcium Supplements? Yes Vit D     Prolia administered: Yes    Prolia dosage: 60 mg was administered slowly subcutaneously into the left upper arm. Response to treatment:  Well tolerated by patient. Due for next dose of Prolia after February 10, 2023.      Electronically signed by Steven Ambriz RN on 8/9/2022 at 9:24 AM

## 2022-09-01 ENCOUNTER — TELEMEDICINE (OUTPATIENT)
Dept: FAMILY MEDICINE CLINIC | Age: 72
End: 2022-09-01
Payer: MEDICARE

## 2022-09-01 DIAGNOSIS — Z00.00 MEDICARE ANNUAL WELLNESS VISIT, SUBSEQUENT: Primary | ICD-10-CM

## 2022-09-01 DIAGNOSIS — Z71.89 ACP (ADVANCE CARE PLANNING): ICD-10-CM

## 2022-09-01 PROCEDURE — 1123F ACP DISCUSS/DSCN MKR DOCD: CPT | Performed by: NURSE PRACTITIONER

## 2022-09-01 PROCEDURE — G0439 PPPS, SUBSEQ VISIT: HCPCS | Performed by: NURSE PRACTITIONER

## 2022-09-01 PROCEDURE — 99497 ADVNCD CARE PLAN 30 MIN: CPT | Performed by: NURSE PRACTITIONER

## 2022-09-01 PROCEDURE — 3017F COLORECTAL CA SCREEN DOC REV: CPT | Performed by: NURSE PRACTITIONER

## 2022-09-01 ASSESSMENT — PATIENT HEALTH QUESTIONNAIRE - PHQ9
SUM OF ALL RESPONSES TO PHQ QUESTIONS 1-9: 0
SUM OF ALL RESPONSES TO PHQ9 QUESTIONS 1 & 2: 0
SUM OF ALL RESPONSES TO PHQ QUESTIONS 1-9: 0
2. FEELING DOWN, DEPRESSED OR HOPELESS: 0
1. LITTLE INTEREST OR PLEASURE IN DOING THINGS: 0

## 2022-09-01 ASSESSMENT — LIFESTYLE VARIABLES
HOW OFTEN DO YOU HAVE A DRINK CONTAINING ALCOHOL: MONTHLY OR LESS
HOW MANY STANDARD DRINKS CONTAINING ALCOHOL DO YOU HAVE ON A TYPICAL DAY: 1 OR 2

## 2022-09-01 NOTE — PATIENT INSTRUCTIONS
Personalized Preventive Plan for Brigid Millan - 9/1/2022  Medicare offers a range of preventive health benefits. Some of the tests and screenings are paid in full while other may be subject to a deductible, co-insurance, and/or copay. Some of these benefits include a comprehensive review of your medical history including lifestyle, illnesses that may run in your family, and various assessments and screenings as appropriate. After reviewing your medical record and screening and assessments performed today your provider may have ordered immunizations, labs, imaging, and/or referrals for you. A list of these orders (if applicable) as well as your Preventive Care list are included within your After Visit Summary for your review. Other Preventive Recommendations:    A preventive eye exam performed by an eye specialist is recommended every 1-2 years to screen for glaucoma; cataracts, macular degeneration, and other eye disorders. A preventive dental visit is recommended every 6 months. Try to get at least 150 minutes of exercise per week or 10,000 steps per day on a pedometer . Order or download the FREE \"Exercise & Physical Activity: Your Everyday Guide\" from [employer]. Call 1-366.653.2835 or search [employer] online. You need 5651-7648 mg of calcium and 9818-7727 IU of vitamin D per day. It is possible to meet your calcium requirement with diet alone, but a vitamin D supplement is usually necessary to meet this goal.  When exposed to the sun, use a sunscreen that protects against both UVA and UVB radiation with an SPF of 30 or greater. Reapply every 2 to 3 hours or after sweating, drying off with a towel, or swimming. Always wear a seat belt when traveling in a car. Always wear a helmet when riding a bicycle or motorcycle.

## 2022-09-01 NOTE — PROGRESS NOTES
Medicare Annual Wellness Visit    Zulay Willard is here for Medicare AWV    Assessment & Plan   Medicare annual wellness visit, subsequent  ACP (advance care planning)  -     [facility] [id number]    Recommendations for Preventive Services Due: see orders and patient instructions/AVS.  Recommended screening schedule for the next 5-10 years is provided to the patient in written form: see Patient Instructions/AVS.     Return for Medicare Annual Wellness Visit in 1 year. Subjective       Patient's complete Health Risk Assessment and screening values have been reviewed and are found in Flowsheets. The following problems were reviewed today and where indicated follow up appointments were made and/or referrals ordered. Positive Risk Factor Screenings with Interventions:     Cognitive: Words recalled: 2 Words Recalled  Total Score Interpretation: Abnormal Mini-Cog  Did the patient refuse to take the cognition test?: No  Cognitive Impairment Interventions:  Patient declines any further evaluation/treatment for cognitive impairment  Patient lives with a caregiver.             General Health and ACP:  General  In general, how would you say your health is?: Excellent  In the past 7 days, have you experienced any of the following: New or Increased Pain, New or Increased Fatigue, Loneliness, Social Isolation, Stress or Anger?: No  Do you get the social and emotional support that you need?: Yes  Do you have a Living Will?: (!) No    Advance Directives       Power of [facility] Will ACP-Advance Directive ACP-Power of     Not on File Not on File Not on File Not on File          General Health Risk Interventions:  No Living Will: Advance Care Planning addressed with patient today     Hearing/Vision:  Do you or your family notice any trouble with your hearing that hasn't been managed with hearing aids?: (!) Yes (moderate hearing loss and wears hearing aids)  Do you have difficulty driving, watching TV, or doing any of your daily activities because of your eyesight?: No  Have you had an eye exam within the past year?: Yes  No results found. Hearing/Vision Interventions:  Hearing concerns:  patient declines any further evaluation/treatment for hearing issues     ADLs:  In the past 7 days, did you need help from others to perform any of the following everyday activities: Eating, dressing, grooming, bathing, toileting, or walking/balance?: (!) Yes (preparing meals)  Select all that apply: (!) Eating  In the past 7 days, did you need help from others to take care of any of the following: Laundry, housekeeping, banking/finances, shopping, telephone use, food preparation, transportation, or taking medications?: (!) Yes  Select all that apply: [employer], Housekeeping, Banking/Finances, Shopping, Telephone Use, Food Preparation, Transportation, Taking Medications  ADL Interventions:  Patient lives with a caregiver. Objective      Patient-Reported Vitals  No data recorded          No Known Allergies  Prior to Visit Medications    Medication Sig Taking? Authorizing Provider   Cholecalciferol (VITAMIN D3) 50 MCG (2000 UT) TABS TAKE ONE TABLET BY MOUTH ONCE A DAY. SUPPLEMENT Yes Gretta Orr MD   fexofenadine (ALLERGY RELIEF) 180 MG tablet TAKE ONE TABLET BY MOUTH ONCE DAILY FOR ALLERGIES. Yes Gretta Orr MD   omeprazole (PRILOSEC) 20 MG delayed release capsule TAKE 1 CAPSULE BY MOUTH ONCE DAILY FOR GERD. Yes Gretta Orr MD   escitalopram (LEXAPRO) 20 MG tablet TAKE ONE TABLET BY MOUTH ONCE DAILY FOR MOOD Yes Gretta Orr MD   meloxicam (MOBIC) 7.5 MG tablet TAKE ONE TABLET BY MOUTH up to ONCE A DAY.  FOR PAIN Yes Gretta Orr MD   vitamin B-12 (CYANOCOBALAMIN) 1000 MCG tablet TAKE ONE TABLET ONCE DAILY: VITAMIN Yes Gretta Orr MD   Multiple Vitamin (THEREMS) TABS TAKE ONE TABLET BY MOUTH ONCE DAILY -VITAMIN SUPPLEMENT Yes Gretta Orr MD   polyethylene glycol (MIRALAX) 17 g PACK packet 17 grams once daily for constipation Yes Romana Carlton MD   carbamide peroxide (DEBROX) 6.5 % otic solution Place 2 drops in each ear every two weeks  Patient taking differently: 5 drops Indications: per caregiver getting through ENT now, 5 drops each for 3 consec days x 1 month Yes Kallie Walton MD   levETIRAcetam (KEPPRA) 500 MG tablet TAKE ONE TABLET BY MOUTH TWICE DAILY FOR SEIZURES Yes Romana Carlton MD   pseudoephedrine (SUDAFED) 30 MG tablet Take 30 mg by mouth every 4 hours as needed for Congestion 1-2 Every 6 hours as needed for cold symptoms (Sneezing, running nose, nasal congestion) Yes Historical Provider, MD   acetaminophen (TYLENOL) 325 MG tablet Take 325 mg by mouth Take 2 tablets every by mouth every 4 hours as needed for minor pain or fever over 101 degrees. Yes Historical Provider, MD   SUNSCREEN SPF50 EX Apply topically Apply as needed to protect skin from from sunburn. Yes Historical Provider, MD   Multiple Vitamins-Minerals (THERAPEUTIC MULTIVITAMIN-MINERALS) tablet Take 1 tablet by mouth daily. Yes Historical Provider, MD   denosumab (PROLIA) 60 MG/ML SOSY SC injection Inject 1 mL into the skin once for 1 dose  Romana Carlton MD       [facility] (Including outside providers/suppliers regularly involved in providing care):   Patient Care Team:  Romana Carlton MD as PCP - General  Romana Carlton MD as PCP - [facility] Empaneled Provider     Reviewed and updated this visit:  Allergies  [facility] was evaluated through a synchronous (real-time) audio-video encounter. The patient (or guardian if applicable) is aware that this is a billable service, which includes applicable co-pays. This Virtual Visit was conducted with patient's (and/or legal guardian's) consent.  The visit was conducted pursuant to the emergency declaration under the [facility] [address]5 waiver authority and the Coronavirus Preparedness and Response Supplemental Appropriations Act. Patient identification was verified, and a caregiver was present when appropriate. The patient was located at Home: [facility]  [address] [address]. Provider was located at [facility] (Appt Dept): [address],  [facility] [address]. Advance Care Planning   Advanced Care Planning: Discussed the patients choices for care and treatment in case of a health event that adversely affects decision-making abilities. Also discussed the patients long-term treatment options. Reviewed with the patient the appropriate state-specific advance directive documents. Reviewed the process of designating a competent adult as an Agent (or -in-fact) that could take make health care decisions for the patient if incompetent. Patient was asked to complete the declaration forms, either acknowledge the forms by a public notary or an eligible witness and provide a signed copy to the practice office. Time spent (minutes): 10 min    Will mail living will paperwork.

## 2022-09-29 ENCOUNTER — HOSPITAL ENCOUNTER (OUTPATIENT)
Dept: WOMENS IMAGING | Age: 72
Discharge: HOME | End: 2022-09-29
Payer: MEDICARE

## 2022-09-29 DIAGNOSIS — Z12.31 BREAST CANCER SCREENING BY MAMMOGRAM: ICD-10-CM

## 2022-09-29 PROCEDURE — 77067 SCR MAMMO BI INCL CAD: CPT

## 2022-10-04 DIAGNOSIS — M19.90 ARTHRITIS: ICD-10-CM

## 2022-10-04 RX ORDER — MELOXICAM 7.5 MG/1
TABLET ORAL
Qty: 30 TABLET | Refills: 2 | Status: SHIPPED | OUTPATIENT
Start: 2022-10-04

## 2022-10-14 ENCOUNTER — NURSE ONLY (OUTPATIENT)
Dept: FAMILY MEDICINE CLINIC | Age: 72
End: 2022-10-14
Payer: MEDICARE

## 2022-10-14 PROCEDURE — 90674 CCIIV4 VAC NO PRSV 0.5 ML IM: CPT | Performed by: INTERNAL MEDICINE

## 2022-10-14 PROCEDURE — G0008 ADMIN INFLUENZA VIRUS VAC: HCPCS | Performed by: INTERNAL MEDICINE

## 2022-11-02 ENCOUNTER — OFFICE VISIT (OUTPATIENT)
Dept: INTERNAL MEDICINE CLINIC | Age: 72
End: 2022-11-02
Payer: MEDICARE

## 2022-11-02 VITALS
SYSTOLIC BLOOD PRESSURE: 132 MMHG | DIASTOLIC BLOOD PRESSURE: 72 MMHG | OXYGEN SATURATION: 95 % | HEART RATE: 79 BPM | WEIGHT: 174.2 LBS | RESPIRATION RATE: 17 BRPM | BODY MASS INDEX: 27.34 KG/M2 | HEIGHT: 67 IN

## 2022-11-02 DIAGNOSIS — M81.0 AGE RELATED OSTEOPOROSIS, UNSPECIFIED PATHOLOGICAL FRACTURE PRESENCE: ICD-10-CM

## 2022-11-02 DIAGNOSIS — K21.9 GASTROESOPHAGEAL REFLUX DISEASE WITHOUT ESOPHAGITIS: ICD-10-CM

## 2022-11-02 DIAGNOSIS — G40.909 NONINTRACTABLE EPILEPSY WITHOUT STATUS EPILEPTICUS, UNSPECIFIED EPILEPSY TYPE (HCC): Primary | ICD-10-CM

## 2022-11-02 DIAGNOSIS — Z79.899 HIGH RISK MEDICATION USE: ICD-10-CM

## 2022-11-02 PROCEDURE — 1090F PRES/ABSN URINE INCON ASSESS: CPT | Performed by: STUDENT IN AN ORGANIZED HEALTH CARE EDUCATION/TRAINING PROGRAM

## 2022-11-02 PROCEDURE — 99213 OFFICE O/P EST LOW 20 MIN: CPT | Performed by: STUDENT IN AN ORGANIZED HEALTH CARE EDUCATION/TRAINING PROGRAM

## 2022-11-02 PROCEDURE — 1036F TOBACCO NON-USER: CPT | Performed by: STUDENT IN AN ORGANIZED HEALTH CARE EDUCATION/TRAINING PROGRAM

## 2022-11-02 PROCEDURE — G8399 PT W/DXA RESULTS DOCUMENT: HCPCS | Performed by: STUDENT IN AN ORGANIZED HEALTH CARE EDUCATION/TRAINING PROGRAM

## 2022-11-02 PROCEDURE — 3017F COLORECTAL CA SCREEN DOC REV: CPT | Performed by: STUDENT IN AN ORGANIZED HEALTH CARE EDUCATION/TRAINING PROGRAM

## 2022-11-02 PROCEDURE — G8427 DOCREV CUR MEDS BY ELIG CLIN: HCPCS | Performed by: STUDENT IN AN ORGANIZED HEALTH CARE EDUCATION/TRAINING PROGRAM

## 2022-11-02 PROCEDURE — G8417 CALC BMI ABV UP PARAM F/U: HCPCS | Performed by: STUDENT IN AN ORGANIZED HEALTH CARE EDUCATION/TRAINING PROGRAM

## 2022-11-02 PROCEDURE — G8482 FLU IMMUNIZE ORDER/ADMIN: HCPCS | Performed by: STUDENT IN AN ORGANIZED HEALTH CARE EDUCATION/TRAINING PROGRAM

## 2022-11-02 PROCEDURE — 1123F ACP DISCUSS/DSCN MKR DOCD: CPT | Performed by: STUDENT IN AN ORGANIZED HEALTH CARE EDUCATION/TRAINING PROGRAM

## 2022-11-02 RX ORDER — CHOLECALCIFEROL (VITAMIN D3) 125 MCG
CAPSULE ORAL
Qty: 90 TABLET | Refills: 3 | Status: SHIPPED | OUTPATIENT
Start: 2022-11-02

## 2022-11-02 RX ORDER — OMEPRAZOLE 20 MG/1
20 CAPSULE, DELAYED RELEASE ORAL DAILY
Qty: 90 CAPSULE | Refills: 3 | Status: SHIPPED | OUTPATIENT
Start: 2022-11-02

## 2022-11-02 ASSESSMENT — PATIENT HEALTH QUESTIONNAIRE - PHQ9
SUM OF ALL RESPONSES TO PHQ9 QUESTIONS 1 & 2: 0
SUM OF ALL RESPONSES TO PHQ QUESTIONS 1-9: 0
2. FEELING DOWN, DEPRESSED OR HOPELESS: 0
SUM OF ALL RESPONSES TO PHQ QUESTIONS 1-9: 0
1. LITTLE INTEREST OR PLEASURE IN DOING THINGS: 0

## 2022-11-02 NOTE — PROGRESS NOTES
[facility]) Internal Medicine      is a 67year old male/female with past medical history as listed below who presents to the office for routine follow-up. Patient previously followed with Dr. Kelsie Fitzpatrick. Seizure: Patient compliant with Keppra for seizure disorder    GERD: Patient is compliant with omeprazole daily. Her GERD symptoms are controlled. Review of Systems   Constitutional:  Negative for chills, fatigue and fever. HENT:  Negative for congestion, ear pain and sore throat. Respiratory:  Negative for cough and chest tightness. Cardiovascular:  Negative for chest pain, palpitations and leg swelling. Gastrointestinal:  Negative for abdominal pain, constipation, diarrhea, nausea and vomiting. Genitourinary:  Negative for dysuria, flank pain and urgency. Musculoskeletal:  Negative for arthralgias, back pain and joint swelling. Skin:  Negative for rash. Neurological:  Negative for dizziness, weakness and numbness. Psychiatric/Behavioral:  Negative for sleep disturbance. The patient is not nervous/anxious.       Past Medical History:   Diagnosis Date    Age-related osteoporosis without current pathological fracture 7/10/2017    7.2017 -3 spine -2.2 hip vit D 77 fosamax    Asthma     Bilateral hearing loss 7/1/2021    Bilateral hearing loss 7/1/2021    Calculus of gallbladder without cholecystitis without obstruction 8/8/2019    Constipation     Cough 2/5/2018    FEV1/FVC 79% post 80 % FEV1 2.31 ( 73%) restriction on change with bronchodilators    Depression     Gastroesophageal reflux     H/O impacted cerumen 1/6/2022    Recurrent     Hypertension     Mental retardation, moderate (I.Q. 35-49)     Mentally challenged 8/8/2019    Seizure disorder ([facility] [address]     Senile osteoporosis 1/21/2022    Shortness of breath 8/16/2017    ? asthma    Slow transit constipation 8/16/2017       Past Surgical History:   Procedure Laterality Date    COLONOSCOPY      HYSTERECTOMY (CERVIX STATUS UNKNOWN) HYSTERECTOMY, VAGINAL      SALPINGO-OOPHORECTOMY Bilateral     beningn mass       Social History     Socioeconomic History    Marital status:      Spouse name: Not on file    Number of children: Not on file    Years of education: Not on file    Highest education level: Not on file   Occupational History    Not on file   Tobacco Use    Smoking status: Never    Smokeless tobacco: Never   Vaping Use    Vaping Use: Never used   Substance and Sexual Activity    Alcohol use: No    Drug use: No    Sexual activity: Not on file   Other Topics Concern    Not on file   Social History Narrative    Not on file     Social Determinants of Health     Financial Resource Strain: Low Risk     Difficulty of Paying Living Expenses: Not hard at all   Food Insecurity: No Food Insecurity    Worried About Running Out of Food in the Last Year: Never true    [place] of Food in the Last Year: Never true   Transportation Needs: Not on file   Physical Activity: Insufficiently Active    Days of Exercise per Week: 5 days    Minutes of Exercise per Session: 20 min   Stress: Not on file   Social Connections: Not on file   Intimate Partner Violence: Not on file   Housing Stability: Not on file       No family history on file. Current Outpatient Medications:     Cholecalciferol (VITAMIN D3) 50 MCG (2000 UT) TABS, TAKE ONE TABLET BY MOUTH ONCE A DAY. SUPPLEMENT, Disp: 90 tablet, Rfl: 3    omeprazole (PRILOSEC) 20 MG delayed release capsule, Take 1 capsule by mouth Daily, Disp: 90 capsule, Rfl: 3    meloxicam (MOBIC) 7.5 MG tablet, TAKE ONE TABLET BY MOUTH ONCE A DAY.  FOR PAIN, Disp: 30 tablet, Rfl: 2    fexofenadine (ALLERGY RELIEF) 180 MG tablet, TAKE ONE TABLET BY MOUTH ONCE DAILY FOR ALLERGIES., Disp: 90 tablet, Rfl: 0    escitalopram (LEXAPRO) 20 MG tablet, TAKE ONE TABLET BY MOUTH ONCE DAILY FOR MOOD, Disp: 30 tablet, Rfl: 11    vitamin B-12 (CYANOCOBALAMIN) 1000 MCG tablet, TAKE ONE TABLET ONCE DAILY: VITAMIN, Disp: 30 tablet, Rfl: 5    Multiple Vitamin (THEREMS) TABS, TAKE ONE TABLET BY MOUTH ONCE DAILY -VITAMIN SUPPLEMENT, Disp: 31 tablet, Rfl: 5    polyethylene glycol (MIRALAX) 17 g PACK packet, 17 grams once daily for constipation, Disp: 100 each, Rfl: 0    carbamide peroxide (DEBROX) 6.5 % otic solution, Place 2 drops in each ear every two weeks (Patient taking differently: 5 drops Indications: per caregiver getting through ENT now, 5 drops each for 3 consec days x 1 month), Disp: 1 Bottle, Rfl: 0    levETIRAcetam (KEPPRA) 500 MG tablet, TAKE ONE TABLET BY MOUTH TWICE DAILY FOR SEIZURES, Disp: 60 tablet, Rfl: 0    pseudoephedrine (SUDAFED) 30 MG tablet, Take 30 mg by mouth every 4 hours as needed for Congestion 1-2 Every 6 hours as needed for cold symptoms (Sneezing, running nose, nasal congestion), Disp: , Rfl:     acetaminophen (TYLENOL) 325 MG tablet, Take 325 mg by mouth Take 2 tablets every by mouth every 4 hours as needed for minor pain or fever over 101 degrees. , Disp: , Rfl:     SUNSCREEN SPF50 EX, Apply topically Apply as needed to protect skin from from sunburn., Disp: , Rfl:     Multiple Vitamins-Minerals (THERAPEUTIC MULTIVITAMIN-MINERALS) tablet, Take 1 tablet by mouth daily. , Disp: , Rfl:     denosumab (PROLIA) 60 MG/ML SOSY SC injection, Inject 1 mL into the skin once for 1 dose, Disp: 1 mL, Rfl: 0     Examination  Vitals:    11/02/22 1432   BP: 132/72   Site: Left Upper Arm   Position: Sitting   Cuff Size: Large Adult   Pulse: 79   Resp: 17   SpO2: 95%   Weight: 174 lb 3.2 oz (79 kg)   Height: 5' 7\" (1.702 m)      Physical Exam  Constitutional:       General: She is not in acute distress. HENT:      Mouth/Throat:      Mouth: Mucous membranes are moist.   Eyes:      General: No scleral icterus. Pupils: Pupils are equal, round, and reactive to light. Cardiovascular:      Rate and Rhythm: Normal rate and regular rhythm. Pulses: Normal pulses. Heart sounds: No murmur heard. No gallop.    Pulmonary: Effort: Pulmonary effort is normal. No respiratory distress. Breath sounds: Normal breath sounds. No wheezing, rhonchi or rales. Abdominal:      General: Abdomen is flat. Bowel sounds are normal. There is no distension. Palpations: Abdomen is soft. Tenderness: There is no abdominal tenderness. Musculoskeletal:      Right lower leg: No edema. Left lower leg: No edema. Skin:     General: Skin is warm and dry. Findings: No erythema or rash. Neurological:      General: No focal deficit present. Mental Status: She is alert and oriented to person, place, and time. Psychiatric:         Mood and Affect: Mood normal.         Behavior: Behavior normal.        Assessment and Plan  Epilepsy (HCC)   - Continue levetiracetam 500 mg p.o. twice daily    Gastroesophageal reflux   - Continue omeprazole 20 mg p.o. daily       Discussed use, benefit, and side effects of prescribed medications. Barriers to medication compliance addressed. Discussed all ordered testing and labs. All patient questions answered. Patient agreeable with plan above. Please note that this chart was generated using dragon dictation software. Although every effort was made to ensure the accuracy of this automated transcription, some errors in transcription may have occurred.

## 2022-11-18 ASSESSMENT — ENCOUNTER SYMPTOMS
CONSTIPATION: 0
SORE THROAT: 0
ABDOMINAL PAIN: 0
CHEST TIGHTNESS: 0
COUGH: 0
NAUSEA: 0
BACK PAIN: 0
DIARRHEA: 0
VOMITING: 0